# Patient Record
Sex: MALE | Race: WHITE | Employment: OTHER | URBAN - METROPOLITAN AREA
[De-identification: names, ages, dates, MRNs, and addresses within clinical notes are randomized per-mention and may not be internally consistent; named-entity substitution may affect disease eponyms.]

---

## 2018-06-19 ENCOUNTER — COMPLETE SKIN EXAM (OUTPATIENT)
Dept: URBAN - METROPOLITAN AREA CLINIC 14 | Facility: CLINIC | Age: 63
Setting detail: DERMATOLOGY
End: 2018-06-19

## 2018-06-19 DIAGNOSIS — C44.519 BASAL CELL CARCINOMA OF SKIN OF OTHER PART OF TRUNK: ICD-10-CM

## 2018-06-19 PROCEDURE — 17000 DESTRUCT PREMALG LESION: CPT

## 2018-06-19 PROCEDURE — 99203 OFFICE O/P NEW LOW 30 MIN: CPT

## 2019-01-09 PROBLEM — G47.00 INSOMNIA, UNSPECIFIED: Status: ACTIVE | Noted: 2019-01-09

## 2019-01-16 ENCOUNTER — HOSPITAL ENCOUNTER (OUTPATIENT)
Dept: CT IMAGING | Age: 64
Discharge: HOME | End: 2019-01-16
Attending: INTERNAL MEDICINE
Payer: COMMERCIAL

## 2019-01-16 VITALS — BODY MASS INDEX: 20.99 KG/M2 | WEIGHT: 155 LBS | HEIGHT: 72 IN

## 2019-01-16 DIAGNOSIS — Z87.891 FORMER SMOKER: ICD-10-CM

## 2019-01-16 PROCEDURE — G0297 LDCT FOR LUNG CA SCREEN: HCPCS

## 2019-01-16 NOTE — PROGRESS NOTES
The CAT scan shows evidence of emphysema and scarring in the lungs. This is not surprising given the previous history of smoking. There is no evidence of any nodules or any sign of lung cancer. This exam should be repeated on an annual basis.

## 2019-04-10 ENCOUNTER — COMPLETE SKIN EXAM (OUTPATIENT)
Dept: URBAN - METROPOLITAN AREA CLINIC 14 | Facility: CLINIC | Age: 64
Setting detail: DERMATOLOGY
End: 2019-04-10

## 2019-04-10 DIAGNOSIS — D04.71 CARCINOMA IN SITU OF SKIN OF RIGHT LOWER LIMB, INCLUDING HIP: ICD-10-CM

## 2019-04-10 PROCEDURE — 17110 DESTRUCT B9 LESION 1-14: CPT

## 2019-04-10 PROCEDURE — 99213 OFFICE O/P EST LOW 20 MIN: CPT

## 2019-10-14 ENCOUNTER — HOSPITAL ENCOUNTER (OUTPATIENT)
Dept: LAB | Age: 64
Discharge: HOME | End: 2019-10-14

## 2019-10-14 PROCEDURE — 88305 TISSUE EXAM BY PATHOLOGIST: CPT

## 2020-01-09 PROBLEM — K21.9 ESOPHAGEAL REFLUX: Status: ACTIVE | Noted: 2020-01-09

## 2020-01-09 PROBLEM — J43.8 OTHER EMPHYSEMA (HCC): Status: ACTIVE | Noted: 2020-01-09

## 2020-01-27 PROBLEM — R93.1 ELEVATED CORONARY ARTERY CALCIUM SCORE: Status: ACTIVE | Noted: 2020-01-27

## 2020-01-27 PROBLEM — R91.8 ABNORMAL CT SCAN OF LUNG: Status: ACTIVE | Noted: 2020-01-27

## 2020-02-04 PROBLEM — Z87.891 PERSONAL HISTORY OF TOBACCO USE, PRESENTING HAZARDS TO HEALTH: Status: ACTIVE | Noted: 2020-02-04

## 2020-02-04 PROBLEM — R91.8 ENDOBRONCHIAL MASS: Status: ACTIVE | Noted: 2020-02-04

## 2020-02-19 RX ORDER — DIPHENHYDRAMINE HYDROCHLORIDE 50 MG/ML
12.5 INJECTION, SOLUTION INTRAMUSCULAR; INTRAVENOUS
Status: CANCELLED | OUTPATIENT
Start: 2020-02-19

## 2020-02-19 RX ORDER — FENTANYL CITRATE 50 UG/ML
50 INJECTION, SOLUTION INTRAMUSCULAR; INTRAVENOUS
Status: CANCELLED | OUTPATIENT
Start: 2020-02-19

## 2020-02-19 RX ORDER — SODIUM CHLORIDE 9 MG/ML
25 INJECTION, SOLUTION INTRAVENOUS CONTINUOUS
Status: CANCELLED | OUTPATIENT
Start: 2020-02-19

## 2020-02-19 RX ORDER — MIDAZOLAM HYDROCHLORIDE 1 MG/ML
.25-5 INJECTION, SOLUTION INTRAMUSCULAR; INTRAVENOUS
Status: CANCELLED | OUTPATIENT
Start: 2020-02-19

## 2020-02-19 RX ORDER — LIDOCAINE HYDROCHLORIDE 20 MG/ML
JELLY TOPICAL AS NEEDED
Status: CANCELLED | OUTPATIENT
Start: 2020-02-19

## 2020-02-19 RX ORDER — LIDOCAINE HYDROCHLORIDE 40 MG/ML
SOLUTION TOPICAL AS NEEDED
Status: CANCELLED | OUTPATIENT
Start: 2020-02-19

## 2020-02-21 NOTE — ADDENDUM NOTE
[facility]: 2/21/2020 02:08 PM     Modules accepted: Orders

## 2020-03-17 ENCOUNTER — HOSPITAL ENCOUNTER (OUTPATIENT)
Dept: CT IMAGING | Age: 65
Discharge: HOME | End: 2020-03-17
Attending: INTERNAL MEDICINE
Payer: COMMERCIAL

## 2020-03-17 DIAGNOSIS — R91.8 ENDOBRONCHIAL MASS: ICD-10-CM

## 2020-03-17 DIAGNOSIS — Z87.891 PERSONAL HISTORY OF SMOKING: ICD-10-CM

## 2020-03-17 DIAGNOSIS — J44.9 CHRONIC OBSTRUCTIVE PULMONARY DISEASE, UNSPECIFIED COPD TYPE (HCC): ICD-10-CM

## 2020-03-17 PROCEDURE — 71250 CT THORAX DX C-: CPT

## 2020-06-08 ENCOUNTER — FOLLOW-UP (OUTPATIENT)
Dept: URBAN - METROPOLITAN AREA CLINIC 14 | Facility: CLINIC | Age: 65
Setting detail: DERMATOLOGY
End: 2020-06-08

## 2020-06-08 DIAGNOSIS — L90.5 SCAR CONDITIONS AND FIBROSIS OF SKIN: ICD-10-CM

## 2020-06-08 DIAGNOSIS — Z85.828 PERSONAL HISTORY OF OTHER MALIGNANT NEOPLASM OF SKIN: ICD-10-CM

## 2020-06-08 PROCEDURE — OTHER COSMETIC: OTHER

## 2020-06-08 PROCEDURE — 99213 OFFICE O/P EST LOW 20 MIN: CPT

## 2020-08-20 ENCOUNTER — OTHER- (OUTPATIENT)
Dept: URBAN - METROPOLITAN AREA CLINIC 14 | Facility: CLINIC | Age: 65
Setting detail: DERMATOLOGY
End: 2020-08-20

## 2020-08-20 DIAGNOSIS — Z85.828 PERSONAL HISTORY OF OTHER MALIGNANT NEOPLASM OF SKIN: ICD-10-CM

## 2020-08-20 DIAGNOSIS — L24.9 IRRITANT CONTACT DERMATITIS, UNSPECIFIED CAUSE: ICD-10-CM

## 2020-08-20 DIAGNOSIS — D22.5 MELANOCYTIC NEVI OF TRUNK: ICD-10-CM

## 2020-08-20 DIAGNOSIS — L82.1 OTHER SEBORRHEIC KERATOSIS: ICD-10-CM

## 2020-08-20 PROCEDURE — 99213 OFFICE O/P EST LOW 20 MIN: CPT

## 2020-11-17 RX ORDER — FLUOCINONIDE 0.5 MG/G
1 APPLICATION CREAM TOPICAL BID
Qty: 60 | Refills: 1
Start: 2020-11-17

## 2021-03-26 ENCOUNTER — HOSPITAL ENCOUNTER (OUTPATIENT)
Dept: CT IMAGING | Age: 66
Discharge: HOME | End: 2021-03-26
Attending: INTERNAL MEDICINE
Payer: MEDICARE

## 2021-03-26 DIAGNOSIS — R91.8 ENDOBRONCHIAL MASS: ICD-10-CM

## 2021-03-26 PROCEDURE — 71250 CT THORAX DX C-: CPT

## 2021-06-09 ENCOUNTER — HOSPITAL ENCOUNTER (OUTPATIENT)
Dept: LAB | Age: 66
Discharge: HOME | End: 2021-06-09

## 2021-06-09 PROCEDURE — 88305 TISSUE EXAM BY PATHOLOGIST: CPT

## 2021-07-23 PROBLEM — F13.99 SEDATIVE, HYPNOTIC OR ANXIOLYTIC USE, UNSPECIFIED WITH UNSPECIFIED SEDATIVE, HYPNOTIC OR ANXIOLYTIC-INDUCED DISORDER (HCC): Status: ACTIVE | Noted: 2021-07-23

## 2021-08-02 ENCOUNTER — HOSPITAL ENCOUNTER (OUTPATIENT)
Dept: PHYSICAL THERAPY | Age: 66
Discharge: HOME | End: 2021-08-02
Attending: NURSE PRACTITIONER
Payer: MEDICARE

## 2021-08-02 DIAGNOSIS — M54.12 CERVICAL RADICULOPATHY: ICD-10-CM

## 2021-08-02 DIAGNOSIS — M54.2 NECK PAIN: ICD-10-CM

## 2021-08-02 DIAGNOSIS — M50.30 DEGENERATIVE DISC DISEASE, CERVICAL: ICD-10-CM

## 2021-08-02 PROCEDURE — 97161 PT EVAL LOW COMPLEX 20 MIN: CPT

## 2021-08-02 PROCEDURE — 97110 THERAPEUTIC EXERCISES: CPT

## 2021-08-02 PROCEDURE — 97140 MANUAL THERAPY 1/> REGIONS: CPT

## 2021-08-02 NOTE — PROGRESS NOTES
Lon Said  : 1955  Primary: Александр Joshi Medicare Choice *  Secondary:  Therapy Center at [facility]  [address], [address] [place], [place], [address]  [phone]   [phone]      OUTPATIENT PHYSICAL THERAPY: Daily Treatment Note 2021    ICD-10: Treatment Diagnosis: Neck pain [M54.2]                Treatment Diagnosis 2: Cervical radiculopathy [M54.12]                Treatment Diagnosis 3: Degenerative disc disease, cervical [M50.30]  Precautions: None. Allergies: Gluten and Wheat   TREATMENT PLAN:  Effective Dates: 2021 TO 2021. Frequency/Duration: 1-2 times a week for 6 weeks MEDICAL/REFERRING DIAGNOSIS:  Neck pain [M54.2]  Cervical radiculopathy [M54.12]  Degenerative disc disease, cervical [M50.30]   DATE OF ONSET: 2021  REFERRING PHYSICIAN: Jayla Oneill NP MD Orders: Evaluate and Treat   Return MD Appointment: 2021     Pre-treatment Symptoms/Complaints:  Pt reports overall symptoms are mild today due likely to has been taking prescribed prednisone for 5 days. Pain: Initial: Pain Intensity 1: 2  Pain Location 1: Neck  Pain Orientation 1: Right  Post Session:  1/10   Medications Last Reviewed:  2021  Updated Objective Findings:  See evaluation note from today   TREATMENT:   THERAPEUTIC EXERCISE: (15 minutes):  Exercises per grid below to improve mobility, strength and coordination. Required moderate visual, verbal, manual and tactile cues to promote proper body alignment, promote proper body posture and promote proper body mechanics. Progressed resistance, range, repetitions and complexity of movement as indicated.      Date:  2021 Date:   Date:     Activity/Exercise Parameters Parameters Parameters   Cervical rotation -->     Chin tuck Supine, 10x 5sec     Scapular retraction Seated 20x     Upper trap stretch Seated 3x30 sec                         Time spent with patient reviewing proper muscle recruitment and technique with exercises. MANUAL THERAPY: (23 minutes): Joint mobilization, Soft tissue mobilization and PROM, manual stretching, manual traction was utilized and necessary because of the patient's restricted joint motion, painful spasm, loss of articular motion and restricted motion of soft tissue   Manual traction: cervical to patient tolerance.  Manual stretching: Bilateral upper trapezius.  PROM: Rotation bilateral to patient tolerance.  Joint Mobs: Lower cervical grade II/III upglides bilaterally.  Soft tissue mobilization: cervical paraspinals, suboccipitals, Bilateral upper trap. MODALITIES: (0 minutes):      None today. HEP: As above; handouts given to patient for all exercises. Treatment/Session Summary:    · Response to Treatment:  Good tolerance to manual and therex interventions; increased verbal instruction provided to ensure correct performance of chin tuck exercise; improved c spine rotation observed following lower c spine mobilization. .  · Communication/Consultation:  HEP handout provided. · Equipment provided today:  None today  · Recommendations/Intent for next treatment session: Next visit will focus on c spine and postural stability strengthening, c spine mobility improvement, c spine muscular flexibility improvement. Total Treatment Billable Duration:  58 minutes: 20 evaluation, 15 therex, 23 manual therapy.   PT Patient Time In/Time Out  Time In: 1230  Time Out: [address], ROSALIND

## 2021-08-02 NOTE — THERAPY EVALUATION
Hamilton Villagran  : 1955  Primary: Tierney Joshi Medicare Choice *  Secondary:  Therapy Center at [address], [place], [address] [place] [address]  [phone]   [phone]       OUTPATIENT PHYSICAL THERAPY:Initial Assessment 2021    ICD-10: Treatment Diagnosis: Neck pain [M54.2]                Treatment Diagnosis 2: Cervical radiculopathy [M54.12]                Treatment Diagnosis 3: Degenerative disc disease, cervical [M50.30]  Precautions: None. Allergies: Gluten and Wheat   TREATMENT PLAN:  Effective Dates: 2021 TO 2021. Frequency/Duration: 1-2 times a week for 6 weeks MEDICAL/REFERRING DIAGNOSIS:  Neck pain [M54.2]  Cervical radiculopathy [M54.12]  Degenerative disc disease, cervical [M50.30]   DATE OF ONSET: 2021  REFERRING PHYSICIAN: Everton Dickson NP MD Orders: Evaluate and Treat   Return MD Appointment: 2021     INITIAL ASSESSMENT:  Mr. Domenico Bautista is a 72 y.o. male presenting to physical therapy with complaints of neck pain with radicular symptoms left upper extremity; insidious onset starting per patient Spring 2021. Pt reports underwent x-ray imaging at MD office indicating degenerative disc disease cervical spine. Pt was prescribed prednisone regiment and referred for outpatient PT treatment. Pt denies vision changes/dizziness/loss of consciousness/head ache symptoms. Pt reports some burning in bilateral feet per patient undergoing assessment for potential low back involvement vs. Peripheral neuropathy symptoms. Pt will benefit from skilled PT treatment to address deficits in cervical spine mobility/AROM, postural stability strength and functional activity tolerance. PROBLEM LIST (Impacting functional limitations):  1. Decreased Strength  2. Decreased ADL/Functional Activities  3. Increased Pain  4. Decreased Activity Tolerance  5. Decreased Flexibility/Joint Mobility  6.  Decreased [place] with Home Exercise Program INTERVENTIONS PLANNED: (Treatment may consist of any combination of the following)  1. Balance Exercise  2. Bed Mobility  3. Cryotherapy  4. Electrical Stimulation  5. Family Education  6. Gait Training  7. Heat  8. Home Exercise Program (HEP)  9. Manual Therapy  10. Neuromuscular Re-education/Strengthening  11. Range of Motion (ROM)  12. Therapeutic Activites  13. Therapeutic Exercise/Strengthening  14. Transcutaneous Electrical Nerve Stimulation (TENS)  15. Transfer Training  16. Ultrasound (US)     GOALS: (Goals have been discussed and agreed upon with patient.)  Short-Term Functional Goals: Time Frame: 8/2/2021 to 8/23/2021  1. Patient demonstrates independence with home exercise program without verbal cueing provided by therapist.  2. Pt will reports worst pain 3/10 or less in order to return to unrestricted prolonged sitting 30 mins or greater. Discharge Goals: Time Frame: 8/2/2021 to 9/13/2021  1. Pt will demonstrate cervical spine AROM side bend at 45 degrees bilaterally and rotation at 60 degrees bilaterally in order to improve head turn activities including checking blind spots while driving. 2. Pt will demonstrate gross bilateral upper extremity strength 4+ to 5/5 in order to return to unrestricted yard work activities including mowing lawn. 3. Pt will report no radicular symptoms into bilateral upper extremities in order to improve reaching and lifting activities. 4. NDI score of 0/50 in order to demonstrate overall functional improvement. Outcome Measure: Tool Used: Neck Disability Index (NDI)  Score:  Initial: 2/50  Most Recent: X/50 (Date: -- )   Interpretation of Score: The Neck Disability Index is a revised form of the Oswestry Low Back Pain Index and is designed to measure the activities of daily living in person's with neck pain. Each section is scored on a 0-5 scale, 5 representing the greatest disability. The scores of each section are added together for a total score of 50. Medical Necessity:   · Patient is expected to demonstrate progress in strength, range of motion, coordination and functional technique to increase independence with head turns while driving, activities required upward gaze, and functional reaching/lifting activities. .  · Skilled intervention continues to be required due to decreased AROM, decreased strength and decreased functional activity tolerance. .  Reason for Services/Other Comments:  · Patient continues to require skilled intervention due to increasing complexity of exercise. .  Total Evaluation Duration: 20 minutes    Rehabilitation Potential For Stated Goals: Good  Regarding Warneord Dubin Pressley's therapy, I certify that the treatment plan above will be carried out by a therapist or under their direction. Thank you for this referral,  Ariane Rowley, PT   DPT  Referring Physician Signature: Lydia Gupta NP _______________________________ Date _____________             PAIN/SUBJECTIVE:    Initial: Pain Intensity 1: 2  Pain Location 1: Neck  Pain Orientation 1: Right  Post Session:  1/10    HISTORY:    History of Injury/Illness (Reason for Referral):  Mr. Kelly Adler is a 72 y.o. male presenting to physical therapy with complaints of neck pain with radicular symptoms left upper extremity; insidious onset starting per patient Spring 2021. Pt reports underwent x-ray imaging at MD office indicating degenerative disc disease cervical spine. Pt was prescribed prednisone regiment and referred for outpatient PT treatment. Pt denies vision changes/dizziness/loss of consciousness/head ache symptoms. Pt reports some burning in bilateral feet per patient undergoing assessment for potential low back involvement vs. Peripheral neuropathy symptoms.   Past Medical History/Comorbidities:   Mr. Kelly Adler  has a past medical history of Abdominal pain, epigastric, Acute upper respiratory infections of unspecified site, Celiac disease, Depression, Disturbance of skin sensation, Dyspepsia and other specified disorders of function of stomach, Generalized anxiety disorder, Hereditary spherocytosis ([address] Insomnia, unspecified, and Unspecified hypothyroidism. Mr. Anirudh Green  has a past surgical history that includes hx cholecystectomy (12/24/14); hx tonsillectomy; and hx hernia repair. Social History/Living Environment:    Lives with spouse. Prior Level of Function/Work/Activity:  Prior level of function includes independent head turn activities, independent house work, yard work and recreational wood working activities. Dominant Side:         RIGHT    Ambulatory/Rehab Services H2 Model Falls Risk Assessment    Risk Factors:       (1)  Gender [Male] Ability to Rise from Chair:       (0)  Ability to rise in a single movement    Falls Prevention Plan:       No modifications necessary    Total: (5 or greater = High Risk): 1    ©2010 [facility]. All Rights Reserved. [facility] Patent #[id number]. Federal Law prohibits the replication, distribution or use without written permission from [facility] [employer]    Current Medications:        Current Outpatient Medications:     mecobalamin, vitamin B12, (B12 Active) 1,000 mcg chew, , Disp: , Rfl:     multivit with minerals/lutein (MULTIVITAMIN 50 PLUS PO), , Disp: , Rfl:     methylPREDNISolone (MEDROL DOSEPACK) 4 mg tablet, FOLLOW PACKAGE INSTRUCTIONS, Disp: 1 Dose Pack, Rfl: 0    LORazepam (ATIVAN) 0.5 mg tablet, Take 1 Tablet by mouth two (2) times daily as needed for Anxiety. Max Daily Amount: 1 mg., Disp: 60 Tablet, Rfl: 5    FLUoxetine (PROzac) 20 mg capsule, Take 20 mg by mouth daily. , Disp: , Rfl:     cyanocobalamin 1,000 mcg tablet, Take 1,000 mcg by mouth daily. , Disp: , Rfl:     TURMERIC PO, Take  by mouth daily. , Disp: , Rfl:     rosuvastatin (CRESTOR) 20 mg tablet, TAKE ONE TABLET BY MOUTH EVERY EVENING, Disp: 90 Tab, Rfl: 3    mirtazapine (REMERON) 15 mg tablet, Take 1 Tab by mouth daily. , Disp: 90 Tab, Rfl: 3    levothyroxine (SYNTHROID) 125 mcg tablet, Take 1 Tab by mouth Daily (before breakfast). , Disp: 90 Tab, Rfl: 5    Date Last Reviewed:  8/2/2021    Number of Personal Factors/Comorbidities that affect the Plan of Care: 1-2: MODERATE COMPLEXITY    EXAMINATION:      Patient denies any increase of symptoms with coughing, sneezing or valsalva maneuver. Patient denies any headaches, changes in vision, dizziness, vertigo, nausea, drop attacks, black outs, tinnitus, dysphagia, dysarthria, LE symptoms or bowel/bladder dysfunction. Observation/Orthostatic Postural Assessment: Posture: fair, mild to moderate rounded shoulder and forward head. Palpation: Mild tenderness bilateral upper trapezius, scalenes right greater than left. Vertebral-Basilar Screen: negative. ROM:   Cervical extension (degrees): WNL°   Cervical flexion: WNL°   Cervical left side bend: 45°   Cervical right side bend: 40°   Cervical left rotation: 60°   Cervical right rotation: 55°     Strength: Gross BUE strength: 4 to 4+/5    Joint Mobility: Hypomobile lower c spine and upper thoracic spine. Special Tests: Ligament stress tests performed through upper cervical spine for transverse ligament including Sharp-Ann test is WNL, and Bethel-Axis shear test is WNL. Bethel-Axis side flexion test for integrity of alar ligament is WNL. Spurling test is negative. Cervical distraction is \"feels good\". Neurovascular testing for thoracic outlet syndrome is WNL. Neurological Screen:  Myotomes: Key muscle strength testing for bilateral UE is WNL. Dermatomes: Sensation testing through bilateral upper quadrants for light touch is WNL. Reflexes: Biceps (C5), brachioradialis (C6), and triceps (C7) are WNL and WNL. Neural tension tests: Upper limb tension test is WNL. Slump test is WNL. Body Structures Involved:  1. Nerves  2. Bones  3. Joints  4. Muscles  5. Ligaments Body Functions Affected:  1. Neuromusculoskeletal  2.  Movement Related Activities and Participation Affected:  1. Mobility  2. Interpersonal Interactions and Relationships  3.  Community, Social and [place]    Number of elements (examined above) that affect the Plan of Care: 4+: HIGH COMPLEXITY    CLINICAL PRESENTATION:    Presentation: Evolving clinical presentation with changing clinical characteristics: MODERATE COMPLEXITY    CLINICAL DECISION MAKING:    Use of outcome tool(s) and clinical judgement create a POC that gives a: Clear prediction of patient's progress: LOW COMPLEXITY

## 2021-08-06 ENCOUNTER — HOSPITAL ENCOUNTER (OUTPATIENT)
Dept: PHYSICAL THERAPY | Age: 66
Discharge: HOME | End: 2021-08-06
Attending: NURSE PRACTITIONER
Payer: MEDICARE

## 2021-08-06 PROCEDURE — 97110 THERAPEUTIC EXERCISES: CPT

## 2021-08-06 PROCEDURE — 97140 MANUAL THERAPY 1/> REGIONS: CPT

## 2021-08-06 NOTE — PROGRESS NOTES
Anahi Adrianna  : 1955  Primary: [facility] Medicare Choice *  Secondary:  Therapy Center at [facility]  [address], [address] [place], [address]  [phone]   [phone]      OUTPATIENT PHYSICAL THERAPY: Daily Treatment Note 2021    ICD-10: Treatment Diagnosis: Neck pain [M54.2]                Treatment Diagnosis 2: Cervical radiculopathy [M54.12]                Treatment Diagnosis 3: Degenerative disc disease, cervical [M50.30]  Precautions: None. Allergies: Gluten and Wheat   TREATMENT PLAN:  Effective Dates: 2021 TO 2021. Frequency/Duration: 1-2 times a week for 6 weeks MEDICAL/REFERRING DIAGNOSIS:  Neck pain [M54.2]  Cervical radiculopathy [M54.12]  Degenerative disc disease, cervical [M50.30]   DATE OF ONSET: 2021  REFERRING PHYSICIAN: King Lalit NP MD Orders: Evaluate and Treat   Return MD Appointment: 2021     Pre-treatment Symptoms/Complaints:  Pt reports was chain sawing and using loppers yesterday and has increased neck tightness today. HEP compliance good. Pain: Initial: Pain Intensity 1: 2  Pain Location 1: Neck  Pain Orientation 1: Right  Post Session:  0/10   Medications Last Reviewed:  2021  Updated Objective Findings:  Posture: mild forward head and slumped seated posture; correctable with verbal cues. TREATMENT:   THERAPEUTIC EXERCISE: (23 minutes):  Exercises per grid below to improve mobility, strength and coordination. Required moderate visual, verbal, manual and tactile cues to promote proper body alignment, promote proper body posture and promote proper body mechanics. Progressed resistance, range, repetitions and complexity of movement as indicated.      Date:  2021 Date:   Date:     Activity/Exercise Parameters Parameters Parameters   Cervical rotation With open book x10     Chin tuck Supine, 10x 5sec     Scapular retraction Seated 20x     Upper trap stretch Seated 3x30 sec     Open book x10 each     Doorway stretch 3x 30 sec     TB Row      TB Extension        Time spent with patient reviewing proper muscle recruitment and technique with exercises. MANUAL THERAPY: (30 minutes): Joint mobilization, Soft tissue mobilization and PROM, manual stretching, manual traction was utilized and necessary because of the patient's restricted joint motion, painful spasm, loss of articular motion and restricted motion of soft tissue   Manual traction: cervical to patient tolerance.  Manual stretching: Bilateral upper trapezius.  PROM: Rotation bilateral to patient tolerance.  Joint Mobs: Lower cervical grade II/III upglides bilaterally.  Soft tissue mobilization: cervical paraspinals, suboccipitals, Bilateral upper trap. MODALITIES: (0 minutes):      None today. HEP: As above; handouts given to patient for all exercises. Treatment/Session Summary:    · Response to Treatment:  Good symptoms relief noted following today's treatment. 1st rib mobilizations added to address observed 1st rib elevation today. .  · Communication/Consultation:  HEP handout provided. · Equipment provided today:  None today  · Recommendations/Intent for next treatment session: Next visit will focus on c spine and postural stability strengthening, c spine mobility improvement, c spine muscular flexibility improvement. Total Treatment Billable Duration:  53 mins.   PT Patient Time In/Time Out  Time In: 2874  Time Out: 1001 [facility],

## 2021-08-11 ENCOUNTER — HOSPITAL ENCOUNTER (OUTPATIENT)
Dept: PHYSICAL THERAPY | Age: 66
Discharge: HOME | End: 2021-08-11
Attending: NURSE PRACTITIONER
Payer: MEDICARE

## 2021-08-11 PROCEDURE — 97140 MANUAL THERAPY 1/> REGIONS: CPT

## 2021-08-11 PROCEDURE — 97110 THERAPEUTIC EXERCISES: CPT

## 2021-08-11 NOTE — PROGRESS NOTES
Emerita Mccartney  : 1955  Primary: Alondra Joshi Medicare Choice *  Secondary:  Therapy Center at [facility]  [address] [place], [address], [place], [address]  [phone]   [phone]      OUTPATIENT PHYSICAL THERAPY: Daily Treatment Note 2021    ICD-10: Treatment Diagnosis: Neck pain [M54.2]                Treatment Diagnosis 2: Cervical radiculopathy [M54.12]                Treatment Diagnosis 3: Degenerative disc disease, cervical [M50.30]  Precautions: None. Allergies: Gluten and Wheat   TREATMENT PLAN:  Effective Dates: 2021 TO 2021. Frequency/Duration: 1-2 times a week for 6 weeks MEDICAL/REFERRING DIAGNOSIS:  Neck pain [M54.2]  Cervical radiculopathy [M54.12]  Degenerative disc disease, cervical [M50.30]   DATE OF ONSET: 2021  REFERRING PHYSICIAN: Bryan Vizcaino NP MD Orders: Evaluate and Treat   Return MD Appointment: 2021     Pre-treatment Symptoms/Complaints:  Pt reports overall soreness minimal today, per pt slight burning in posterior bilateral upper arms at start of treatment, HEP compliance excellent. Pain: Initial: Pain Intensity 1: 1  Pain Location 1: Neck  Pain Orientation 1: Right, Left  Post Session:  0/10   Medications Last Reviewed:  2021  Updated Objective Findings:  Posture: pt holds correct posture throughout treatment with only 1x verbal cues. TREATMENT:   THERAPEUTIC EXERCISE: (23 minutes):  Exercises per grid below to improve mobility, strength and coordination. Required moderate visual, verbal, manual and tactile cues to promote proper body alignment, promote proper body posture and promote proper body mechanics. Progressed resistance, range, repetitions and complexity of movement as indicated.      Date:  2021 Date:   Date:     Activity/Exercise Parameters Parameters Parameters   Cervical rotation With open book x10 With open book, x20    Chin tuck Supine, 10x 5sec Supine, 20x 5sec     Scapular retraction Seated 20x 20x    Upper trap stretch Seated 3x30 sec Seated, 3x30 sec    Open book x10 each x20 each    Doorway stretch 3x 30 sec 3x 30sec    TB Row  Green, 1x10    TB Extension  Green, 1x10    Cervical extension with towel  -->      Time spent with patient reviewing proper muscle recruitment and technique with exercises. MANUAL THERAPY: (30 minutes): Joint mobilization, Soft tissue mobilization and PROM, manual stretching, manual traction was utilized and necessary because of the patient's restricted joint motion, painful spasm, loss of articular motion and restricted motion of soft tissue   Manual traction: cervical to patient tolerance.  Manual stretching: Bilateral upper trapezius.  PROM: Rotation bilateral to patient tolerance.  Joint Mobs: Lower cervical grade II/III upglides bilaterally.  Soft tissue mobilization: cervical paraspinals, suboccipitals, Bilateral upper trap. MODALITIES: (0 minutes):      None today. HEP: As above; handouts given to patient for all exercises. Treatment/Session Summary:    · Response to Treatment:  Good tolerance to therex and manual interventions. .  · Communication/Consultation:  HEP handout provided. · Equipment provided today:  None today  · Recommendations/Intent for next treatment session: Next visit will focus on c spine and postural stability strengthening, c spine mobility improvement, c spine muscular flexibility improvement. Total Treatment Billable Duration:  53 mins.   PT Patient Time In/Time Out  Time In: 8949  Time Out: 1001 [facility],

## 2021-08-13 ENCOUNTER — HOSPITAL ENCOUNTER (OUTPATIENT)
Dept: PHYSICAL THERAPY | Age: 66
Discharge: HOME | End: 2021-08-13
Attending: NURSE PRACTITIONER
Payer: MEDICARE

## 2021-08-13 PROCEDURE — 97110 THERAPEUTIC EXERCISES: CPT

## 2021-08-13 PROCEDURE — 97140 MANUAL THERAPY 1/> REGIONS: CPT

## 2021-08-13 NOTE — PROGRESS NOTES
Herson Klein  : 1955  Primary: Donne Speaker [employer] Choice *  Secondary:  [facility]  [address], [address], [place], [address]  [phone]   [phone]      OUTPATIENT PHYSICAL THERAPY: Daily Treatment Note 2021    ICD-10: Treatment Diagnosis: Neck pain [M54.2]                Treatment Diagnosis 2: Cervical radiculopathy [M54.12]                Treatment Diagnosis 3: Degenerative disc disease, cervical [M50.30]  Precautions: None. Allergies: Gluten and Wheat   TREATMENT PLAN:  Effective Dates: 2021 TO 2021. Frequency/Duration: 1-2 times a week for 6 weeks MEDICAL/REFERRING DIAGNOSIS:  Neck pain [M54.2]  Cervical radiculopathy [M54.12]  Degenerative disc disease, cervical [M50.30]   DATE OF ONSET: 2021  REFERRING PHYSICIAN: Melinda Saez NP MD Orders: Evaluate and Treat   Return MD Appointment: 2021     Pre-treatment Symptoms/Complaints:  Pt reports pain levels are minimal today; exercises at home going well. Pain: Initial: Pain Intensity 1: 0  Pain Location 1: Neck  Post Session:  0/10   Medications Last Reviewed:  2021  Updated Objective Findings:  None Today   TREATMENT:   THERAPEUTIC EXERCISE: (23 minutes):  Exercises per grid below to improve mobility, strength and coordination. Required moderate visual, verbal, manual and tactile cues to promote proper body alignment, promote proper body posture and promote proper body mechanics. Progressed resistance, range, repetitions and complexity of movement as indicated.      Date:  2021 Date:  2021 Date:  2021   Activity/Exercise Parameters Parameters Parameters   Cervical rotation With open book x10 With open book, x20 With open book, x20   Chin tuck Supine, 10x 5sec Supine, 20x 5sec  Supine, 20x 5sec    Scapular retraction Seated 20x 20x 15x   Upper trap stretch Seated 3x30 sec Seated, 3x30 sec Seated, [id number]   Open book x10 each x20 each x20 each   Doorway stretch 3x 30 sec 3x 30sec [id number]   TB Row  Green, 1x10 Green, 1x10   TB Extension  Green, 1x10 Green, 1x10   Thoracic extension with stability ball  --> x10     Time spent with patient reviewing proper muscle recruitment and technique with exercises. MANUAL THERAPY: (30 minutes): Joint mobilization, Soft tissue mobilization and PROM, manual stretching, manual traction was utilized and necessary because of the patient's restricted joint motion, painful spasm, loss of articular motion and restricted motion of soft tissue   Manual traction: cervical to patient tolerance.  Manual stretching: Bilateral upper trapezius.  PROM: Rotation bilateral to patient tolerance.  Joint Mobs: Lower cervical grade II/III upglides bilaterally.  Soft tissue mobilization: cervical paraspinals, suboccipitals, Bilateral upper trap. MODALITIES: (0 minutes):      None today. HEP: As above; handouts given to patient for all exercises. Treatment/Session Summary:    · Response to Treatment:  Verbal cues for posture during standing postural strengthening exercises. .  · Communication/Consultation:  None today  · Equipment provided today:  None today  · Recommendations/Intent for next treatment session: Next visit will focus on c spine and postural stability strengthening, c spine mobility improvement, c spine muscular flexibility improvement. Total Treatment Billable Duration:  53 mins.   PT Patient Time In/Time Out  Time In: 0903  Time Out: 0957  Ariane Rowley PT

## 2021-08-17 ENCOUNTER — HOSPITAL ENCOUNTER (OUTPATIENT)
Dept: PHYSICAL THERAPY | Age: 66
Discharge: HOME | End: 2021-08-17
Attending: NURSE PRACTITIONER
Payer: MEDICARE

## 2021-08-17 PROCEDURE — 97140 MANUAL THERAPY 1/> REGIONS: CPT

## 2021-08-17 PROCEDURE — 97110 THERAPEUTIC EXERCISES: CPT

## 2021-08-17 NOTE — PROGRESS NOTES
Herson Klein  : 1955  Primary: Donne Speaker [employer] Choice *  Secondary:  [facility]  [address], [address] [facility], [address] [facility] [address]  [phone]   [phone]      OUTPATIENT PHYSICAL THERAPY: Daily Treatment Note 2021    ICD-10: Treatment Diagnosis: Neck pain [M54.2]                Treatment Diagnosis 2: Cervical radiculopathy [M54.12]                Treatment Diagnosis 3: Degenerative disc disease, cervical [M50.30]  Precautions: None. Allergies: Gluten and Wheat   TREATMENT PLAN:  Effective Dates: 2021 TO 2021. Frequency/Duration: 1-2 times a week for 6 weeks MEDICAL/REFERRING DIAGNOSIS:  Neck pain [M54.2]  Cervical radiculopathy [M54.12]  Degenerative disc disease, cervical [M50.30]   DATE OF ONSET: 2021  REFERRING PHYSICIAN: Melinda Saez NP  MD Orders: Evaluate and Treat   Return MD Appointment: 2021     Pre-treatment Symptoms/Complaints:  Pt reports some mid back soreness today unsure of cause; patient reports has been steadily keeping up with yard work includes trimming bushes; HEP compliance excellent. Pain: Initial: Pain Intensity 1: 1  Pain Location 1: Neck  Pain Orientation 1: Right, Left  Post Session:  0/10   Medications Last Reviewed:  2021  Updated Objective Findings:  Increased left sided upper trap tightness noted today. TREATMENT:   THERAPEUTIC EXERCISE: (15 minutes):  Exercises per grid below to improve mobility, strength and coordination. Required moderate visual, verbal, manual and tactile cues to promote proper body alignment, promote proper body posture and promote proper body mechanics. Progressed resistance, range, repetitions and complexity of movement as indicated.      Date:  2021 Date:  2021 Date:  2021   Activity/Exercise Parameters Parameters Parameters   Cervical rotation With open book x10 With open book, x20 With open book, x20   Chin tuck Supine, 10x 5sec Supine, 20x 5sec  Supine, 20x 5sec    Scapular retraction Seated 10x 20x 15x   Upper trap stretch Seated 3x30 sec Seated, 3x30 sec Seated, [id number]   Open book x10 each x20 each x20 each   Doorway stretch [id number] 3x 30sec [id number]   TB Row Green, 1x10 Green, 1x10 Green, 1x10   TB Extension Green, 1x10 Green, 1x10 Green, 1x10   Thoracic extension with stability ball --> --> x10     Time spent with patient reviewing proper muscle recruitment and technique with exercises. MANUAL THERAPY: (30 minutes): Joint mobilization, Soft tissue mobilization and PROM, manual stretching, manual traction was utilized and necessary because of the patient's restricted joint motion, painful spasm, loss of articular motion and restricted motion of soft tissue   Manual traction: cervical to patient tolerance.  Manual stretching: Bilateral upper trapezius.  PROM: Rotation bilateral to patient tolerance.  Joint Mobs: Lower cervical grade II/III upglides bilaterally.  Soft tissue mobilization: cervical paraspinals, suboccipitals, Bilateral upper trap. MODALITIES: (0 minutes):      None today. HEP: As above; handouts given to patient for all exercises. Treatment/Session Summary:    · Response to Treatment:  Exercise program decreased slightly today to prevent excessive post treatment soreness as patient verbalized has done exercises twice today already. · Communication/Consultation:  None today  · Equipment provided today:  None today  · Recommendations/Intent for next treatment session: Next visit will focus on c spine and postural stability strengthening, c spine mobility improvement, c spine muscular flexibility improvement. Total Treatment Billable Duration:  45 mins.   PT Patient Time In/Time Out  Time In: 1430  Time Out: 1518  Layton Mariano, PT

## 2021-08-19 ENCOUNTER — APPOINTMENT (OUTPATIENT)
Dept: PHYSICAL THERAPY | Age: 66
End: 2021-08-19
Attending: NURSE PRACTITIONER
Payer: MEDICARE

## 2021-08-20 ENCOUNTER — HOSPITAL ENCOUNTER (OUTPATIENT)
Dept: PHYSICAL THERAPY | Age: 66
Discharge: HOME | End: 2021-08-20
Attending: NURSE PRACTITIONER
Payer: MEDICARE

## 2021-08-20 PROCEDURE — 97140 MANUAL THERAPY 1/> REGIONS: CPT

## 2021-08-20 PROCEDURE — 97110 THERAPEUTIC EXERCISES: CPT

## 2021-08-20 NOTE — PROGRESS NOTES
Lon Said  : 1955  Primary: Александр Joshi Medicare Choice *  Secondary:  [facility]  [address] [facility], [address] [place], [place], [address]  [phone]   [phone]      OUTPATIENT PHYSICAL THERAPY: Daily Treatment Note 2021    ICD-10: Treatment Diagnosis: Neck pain [M54.2]                Treatment Diagnosis 2: Cervical radiculopathy [M54.12]                Treatment Diagnosis 3: Degenerative disc disease, cervical [M50.30]  Precautions: None. Allergies: Gluten and Wheat   TREATMENT PLAN:  Effective Dates: 2021 TO 2021. Frequency/Duration: 1-2 times a week for 6 weeks MEDICAL/REFERRING DIAGNOSIS:  Neck pain [M54.2]  Cervical radiculopathy [M54.12]  Degenerative disc disease, cervical [M50.30]   DATE OF ONSET: 2021  REFERRING PHYSICIAN: Jayla Oneill NP MD Orders: Evaluate and Treat   Return MD Appointment: 2021     Pre-treatment Symptoms/Complaints:  Pt reports no radicular symptoms and only slight neck pain at start of treatment today. Pain: Initial: Pain Intensity 1: 1  Pain Location 1: Neck  Pain Orientation 1: Right, Left  Post Session:  0/10   Medications Last Reviewed:  2021  Updated Objective Findings:  Minimal upper trap tightness noted bilaterally today compared to previous visit. TREATMENT:   THERAPEUTIC EXERCISE: (23 minutes):  Exercises per grid below to improve mobility, strength and coordination. Required moderate visual, verbal, manual and tactile cues to promote proper body alignment, promote proper body posture and promote proper body mechanics. Progressed resistance, range, repetitions and complexity of movement as indicated.      Date:  2021 Date:  2021 Date:  2021   Activity/Exercise Parameters Parameters Parameters   Cervical rotation With open book x10 With open book, x20 With open book, x20   Chin tuck Supine, 10x 5sec Supine, 20x 5sec     Chin tuck head lift, x10  Supine, 20x 5sec    Scapular retraction Seated 10x 20x 15x   Upper trap stretch Seated 3x30 sec Seated, 3x30 sec Seated, [id number]   Open book x10 each See above. x20 each   Doorway stretch [id number] 3x 30sec [id number]   TB Row Green, 1x10 Blue, 2x10 Green, 1x10   TB Extension Green, 1x10 Green, 2x10 Green, 1x10   Thoracic extension with stability ball --> --> x10      Time spent with patient reviewing proper muscle recruitment and technique with exercises. MANUAL THERAPY: (30 minutes): Joint mobilization, Soft tissue mobilization and PROM, manual stretching, manual traction was utilized and necessary because of the patient's restricted joint motion, painful spasm, loss of articular motion and restricted motion of soft tissue   Manual traction: cervical to patient tolerance.  Manual stretching: Bilateral upper trapezius.  PROM: Rotation bilateral to patient tolerance.  Joint Mobs: Lower cervical grade II/III upglides bilaterally.  Soft tissue mobilization: cervical paraspinals, suboccipitals, Bilateral upper trap. MODALITIES: (0 minutes):      None today. HEP: As above; handouts given to patient for all exercises. Treatment/Session Summary:    · Response to Treatment:  Patient tolerates full exercise progran today with deep neck flexor progression added chin tuck head lift exercise. · Communication/Consultation:  None today  · Equipment provided today:  None today  · Recommendations/Intent for next treatment session: Next visit will focus on c spine and postural stability strengthening, c spine mobility improvement, c spine muscular flexibility improvement. Total Treatment Billable Duration:  53 mins.   PT Patient Time In/Time Out  Time In: 1002  Time Out: Lindy [id number], PT

## 2021-08-23 ENCOUNTER — HOSPITAL ENCOUNTER (OUTPATIENT)
Dept: PHYSICAL THERAPY | Age: 66
Discharge: HOME | End: 2021-08-23
Attending: NURSE PRACTITIONER
Payer: MEDICARE

## 2021-08-23 PROCEDURE — 97140 MANUAL THERAPY 1/> REGIONS: CPT

## 2021-08-23 PROCEDURE — 97110 THERAPEUTIC EXERCISES: CPT

## 2021-08-23 NOTE — PROGRESS NOTES
Melvaia Ren  : 1955  Primary: Disha Joshi Medicare Choice *  Secondary:  [facility]  [address], [address] [place], [place], [address]  [phone]   [phone]      OUTPATIENT PHYSICAL THERAPY: Daily Treatment Note 2021    ICD-10: Treatment Diagnosis: Neck pain [M54.2]                Treatment Diagnosis 2: Cervical radiculopathy [M54.12]                Treatment Diagnosis 3: Degenerative disc disease, cervical [M50.30]  Precautions: None. Allergies: Gluten and Wheat   TREATMENT PLAN:  Effective Dates: 2021 TO 2021. Frequency/Duration: 1-2 times a week for 6 weeks MEDICAL/REFERRING DIAGNOSIS:  Neck pain [M54.2]  Cervical radiculopathy [M54.12]  Degenerative disc disease, cervical [M50.30]   DATE OF ONSET: 2021  REFERRING PHYSICIAN: Jeff Mosher NP MD Orders: Evaluate and Treat   Return MD Appointment: 2021     Pre-treatment Symptoms/Complaints:  Pt reports minimal symptoms over weekend. Pt reports upper arm symptoms are less frequent overall. Pain: Initial: Pain Intensity 1: 1  Pain Location 1: Neck  Pain Orientation 1: Right, Left  Post Session:  0/10   Medications Last Reviewed:  2021  Updated Objective Findings:  None Today   TREATMENT:   THERAPEUTIC EXERCISE: (23 minutes):  Exercises per grid below to improve mobility, strength and coordination. Required moderate visual, verbal, manual and tactile cues to promote proper body alignment, promote proper body posture and promote proper body mechanics. Progressed resistance, range, repetitions and complexity of movement as indicated.      Date:  2021 Date:  2021 Date:  2021   Activity/Exercise Parameters Parameters Parameters   Cervical rotation With open book x10 With open book, x20 With open book, x20   Chin tuck Supine, 10x 5sec Supine, 20x 5sec     Chin tuck head lift, x10  Supine, 20x 5sec     Chin tuck head lift, x10    Scapular retraction Seated 10x 20x 20x   Upper trap stretch Seated 3x30 sec Seated, 3x30 sec Seated, [id number]   Open book x10 each See above. x20 each   Doorway stretch [id number] 3x 30sec [id number]   TB Row Green, 1x10 Blue, 2x10 Blue, 1x10   TB Extension Green, 1x10 Green, 2x10 Green, 1x10   Thoracic extension with stability ball --> --> x10   TRX flexion/extension         Time spent with patient reviewing proper muscle recruitment and technique with exercises. MANUAL THERAPY: (30 minutes): Joint mobilization, Soft tissue mobilization and PROM, manual stretching, manual traction was utilized and necessary because of the patient's restricted joint motion, painful spasm, loss of articular motion and restricted motion of soft tissue   Manual traction: cervical to patient tolerance.  Manual stretching: Bilateral upper trapezius.  PROM: Rotation bilateral to patient tolerance.  Joint Mobs: Lower cervical grade II/III upglides bilaterally.  Soft tissue mobilization: cervical paraspinals, suboccipitals, Bilateral upper trap. MODALITIES: (0 minutes):      None today. HEP: As above; handouts given to patient for all exercises. Treatment/Session Summary:    · Response to Treatment:  Good tolerance to therex and manual interventions; limited range performed on TRX exercise to prevent excessive left shoulder symptoms. · Communication/Consultation:  None today  · Equipment provided today:  None today  · Recommendations/Intent for next treatment session: Next visit will focus on c spine and postural stability strengthening, c spine mobility improvement, c spine muscular flexibility improvement. Total Treatment Billable Duration:  53 mins.   PT Patient Time In/Time Out  Time In: 1330  Time Out: [address], PT

## 2021-08-26 ENCOUNTER — HOSPITAL ENCOUNTER (OUTPATIENT)
Dept: PHYSICAL THERAPY | Age: 66
Discharge: HOME | End: 2021-08-26
Attending: NURSE PRACTITIONER
Payer: MEDICARE

## 2021-08-26 PROCEDURE — 97140 MANUAL THERAPY 1/> REGIONS: CPT

## 2021-08-26 PROCEDURE — 97110 THERAPEUTIC EXERCISES: CPT

## 2021-08-26 NOTE — PROGRESS NOTES
Justin Lovelace  : 1955  Primary: Facundo Joshi Medicare Choice *  Secondary:  [facility]  [address], [address] [place], [address]  [phone]   [phone]      OUTPATIENT PHYSICAL THERAPY: Daily Treatment Note 2021    ICD-10: Treatment Diagnosis: Neck pain [M54.2]                Treatment Diagnosis 2: Cervical radiculopathy [M54.12]                Treatment Diagnosis 3: Degenerative disc disease, cervical [M50.30]  Precautions: None. Allergies: Gluten and Wheat   TREATMENT PLAN:  Effective Dates: 2021 TO 2021. Frequency/Duration: 1-2 times a week for 6 weeks MEDICAL/REFERRING DIAGNOSIS:  Neck pain [M54.2]  Cervical radiculopathy [M54.12]  Degenerative disc disease, cervical [M50.30]   DATE OF ONSET: 2021  REFERRING PHYSICIAN: Taurus Sheppard NP MD Orders: Evaluate and Treat   Return MD Appointment: 2021     Pre-treatment Symptoms/Complaints:  Pt reports performed roughly 2 hours of upper extremity work yesterday including lifting/carrying/packing boxes; pain mostly in low back following these activities. Pain: Initial: Pain Intensity 1: 1  Pain Location 1: Neck  Pain Orientation 1: Right, Left  Post Session:  0/10   Medications Last Reviewed:  2021  Updated Objective Findings:  Posture: good, minimal rounded shoulders/forward head observed today. TREATMENT:   THERAPEUTIC EXERCISE: (23 minutes):  Exercises per grid below to improve mobility, strength and coordination. Required moderate visual, verbal, manual and tactile cues to promote proper body alignment, promote proper body posture and promote proper body mechanics. Progressed resistance, range, repetitions and complexity of movement as indicated.      Date:  2021 Date:  2021 Date:  2021   Activity/Exercise Parameters Parameters Parameters   Cervical rotation With open book x10 With open book, x20 With open book, x20   Chin tuck Supine, 10x 5sec    Chin tuck head lift, x10  Supine, 20x 5sec     Chin tuck head lift, x10  Supine, 20x 5sec     Chin tuck head lift, x10    Scapular retraction Seated 20x 20x 20x   Upper trap stretch Seated 3x30 sec Seated, 3x30 sec Seated, [id number]   Open book See above See above. x20 each   Doorway stretch [id number] 3x 30sec [id number]   TB Row Blue, 2x10 Blue, 2x10 Blue, 1x10   TB Extension Blue, 2x10 Green, 2x10 Green, 1x10   Thoracic extension with stability ball --> --> --   TRX flexion/extension x10  x10      Time spent with patient reviewing proper muscle recruitment and technique with exercises. MANUAL THERAPY: (30 minutes): Joint mobilization, Soft tissue mobilization and PROM, manual stretching, manual traction was utilized and necessary because of the patient's restricted joint motion, painful spasm, loss of articular motion and restricted motion of soft tissue   Manual traction: cervical to patient tolerance.  Manual stretching: Bilateral upper trapezius.  PROM: Rotation bilateral to patient tolerance.  Joint Mobs: Lower cervical grade II/III upglides bilaterally.  Soft tissue mobilization: cervical paraspinals, suboccipitals, Bilateral upper trap. MODALITIES: (0 minutes):      None today. HEP: As above; handouts given to patient for all exercises. Treatment/Session Summary:    · Response to Treatment:  Good tolerance to therex and manual interventions; limited range performed on TRX exercise to prevent excessive left shoulder symptoms. · Communication/Consultation:  None today  · Equipment provided today:  None today  · Recommendations/Intent for next treatment session: Next visit will focus on c spine and postural stability strengthening, c spine mobility improvement, c spine muscular flexibility improvement. Total Treatment Billable Duration:  53 mins.   PT Patient Time In/Time Out  Time In: 1005  Time Out: Lindy [id number], PT

## 2021-08-30 ENCOUNTER — HOSPITAL ENCOUNTER (OUTPATIENT)
Dept: PHYSICAL THERAPY | Age: 66
Discharge: HOME | End: 2021-08-30
Attending: NURSE PRACTITIONER
Payer: MEDICARE

## 2021-08-30 NOTE — PROGRESS NOTES
Physical Therapy  [address] [place] [address]  Date: 8/30/2021    Patient cancelled appointment today; he is being assessed for possible shingles. Plans to attend next appointment this week.       Domingo Virk DPT

## 2021-09-02 ENCOUNTER — HOSPITAL ENCOUNTER (OUTPATIENT)
Dept: PHYSICAL THERAPY | Age: 66
Discharge: HOME | End: 2021-09-02
Attending: NURSE PRACTITIONER
Payer: MEDICARE

## 2021-09-02 PROCEDURE — 97140 MANUAL THERAPY 1/> REGIONS: CPT

## 2021-09-02 PROCEDURE — 97110 THERAPEUTIC EXERCISES: CPT

## 2021-09-02 NOTE — PROGRESS NOTES
Angelika Flako  : 1955  Primary: Rafaela Joshi Medicare Choice *  Secondary:  [facility]  [address], [address] [facility], [address]  [phone]   [phone]      OUTPATIENT PHYSICAL THERAPY: Daily Treatment Note 2021    ICD-10: Treatment Diagnosis: Neck pain [M54.2]                Treatment Diagnosis 2: Cervical radiculopathy [M54.12]                Treatment Diagnosis 3: Degenerative disc disease, cervical [M50.30]  Precautions: None. Allergies: Gluten and Wheat   TREATMENT PLAN:  Effective Dates: 2021 TO 2021. Frequency/Duration: 1-2 times a week for 6 weeks MEDICAL/REFERRING DIAGNOSIS:  Neck pain [M54.2]  Cervical radiculopathy [M54.12]  Degenerative disc disease, cervical [M50.30]   DATE OF ONSET: 2021  REFERRING PHYSICIAN: Deyanira Schultz NP  MD Orders: Evaluate and Treat   Return MD Appointment: 2021     Pre-treatment Symptoms/Complaints:  Pt reports has no pain in neck and upper arms today; per pt only symptoms at this time are mild tingling in B feet. Pain: Initial: Pain Intensity 1: 0  Pain Location 1: Neck  Pain Orientation 1: Right, Left  Post Session:  0/10   Medications Last Reviewed:  2021  Updated Objective Findings:  None Today   TREATMENT:   THERAPEUTIC EXERCISE: (23 minutes):  Exercises per grid below to improve mobility, strength and coordination. Required moderate visual, verbal, manual and tactile cues to promote proper body alignment, promote proper body posture and promote proper body mechanics. Progressed resistance, range, repetitions and complexity of movement as indicated.      Date:  2021 Date:  2021 Date:  2021   Activity/Exercise Parameters Parameters Parameters   Cervical rotation With open book x10 With open book, x10 With open book, x20   Chin tuck Supine, 10x 5sec    Chin tuck head lift, x10  Supine, 10x 5sec     Chin tuck head lift, x10  Supine, 20x 5sec Chin tuck head lift, x10    Scapular retraction Seated 20x 20x 20x   Upper trap stretch Seated 3x30 sec Seated, 3x30 sec Seated, [id number]   Open book See above See above. x20 each   Doorway stretch [id number] 3x 30sec [id number]   TB Row Blue, 2x10 Blue, 3x10 Blue, 1x10   TB Extension Blue, 2x10 Blue, 3x10 Green, 1x10   Thoracic extension with stability ball --> x10 --   TRX flexion/extension x10 x10 x10      Time spent with patient reviewing proper muscle recruitment and technique with exercises. MANUAL THERAPY: (30 minutes): Joint mobilization, Soft tissue mobilization and PROM, manual stretching, manual traction was utilized and necessary because of the patient's restricted joint motion, painful spasm, loss of articular motion and restricted motion of soft tissue   Manual traction: cervical to patient tolerance.  Manual stretching: Bilateral upper trapezius.  PROM: Rotation bilateral to patient tolerance.  Joint Mobs: Lower cervical grade II/III upglides bilaterally.  Soft tissue mobilization: cervical paraspinals, suboccipitals, Bilateral upper trap. MODALITIES: (0 minutes):      None today. HEP: As above; handouts given to patient for all exercises. Treatment/Session Summary:    · Response to Treatment:  Increased manual stretching provided to address obseved increased muscle tightness left levator scap muscle today. · Communication/Consultation:  None today  · Equipment provided today:  None today  · Recommendations/Intent for next treatment session: Next visit will focus on c spine and postural stability strengthening, c spine mobility improvement, c spine muscular flexibility improvement. Total Treatment Billable Duration:  53 mins.   PT Patient Time In/Time Out  Time In: 1004  Time Out: 1059  Delon Body, PT

## 2021-09-07 ENCOUNTER — HOSPITAL ENCOUNTER (OUTPATIENT)
Dept: PHYSICAL THERAPY | Age: 66
Discharge: HOME | End: 2021-09-07
Attending: NURSE PRACTITIONER
Payer: MEDICARE

## 2021-09-07 PROCEDURE — 97140 MANUAL THERAPY 1/> REGIONS: CPT

## 2021-09-07 PROCEDURE — 97110 THERAPEUTIC EXERCISES: CPT

## 2021-09-07 NOTE — PROGRESS NOTES
Arash Bishop  : 1955  Primary: Alcides Joshi Medicare Choice *  Secondary:  [facility]  [address] [place], Suite [facility], [address]  [phone]   [phone]      OUTPATIENT PHYSICAL THERAPY: Daily Treatment Note 2021    ICD-10: Treatment Diagnosis: Neck pain [M54.2]                Treatment Diagnosis 2: Cervical radiculopathy [M54.12]                Treatment Diagnosis 3: Degenerative disc disease, cervical [M50.30]  Precautions: None. Allergies: Gluten and Wheat   TREATMENT PLAN:  Effective Dates: 2021 TO 2021. Frequency/Duration: 1-2 times a week for 6 weeks MEDICAL/REFERRING DIAGNOSIS:  Neck pain [M54.2]  Cervical radiculopathy [M54.12]  Degenerative disc disease, cervical [M50.30]   DATE OF ONSET: 2021  REFERRING PHYSICIAN: Nikita Roa NP MD Orders: Evaluate and Treat   Return MD Appointment: 2021     Pre-treatment Symptoms/Complaints:  Pt reports continued minimal to no pain in neck and upper arms. Primary complaint at this time is bilateral feet intermittent burning symtpoms. Pain: Initial: Pain Intensity 1: 0  Pain Location 1: Neck  Pain Orientation 1: Right, Left  Post Session:  0/10   Medications Last Reviewed:  2021  Updated Objective Findings:  Posture: Good; pt demonstrates ability to self correct posture throughout treatment. TREATMENT:   THERAPEUTIC EXERCISE: (23 minutes):  Exercises per grid below to improve mobility, strength and coordination. Required moderate visual, verbal, manual and tactile cues to promote proper body alignment, promote proper body posture and promote proper body mechanics. Progressed resistance, range, repetitions and complexity of movement as indicated.      Date:  2021 Date:  2021 Date:  2021   Activity/Exercise Parameters Parameters Parameters   Cervical rotation With open book x10 With open book, x10 With open book, x10   Chin tuck Supine, 10x 5sec    Chin tuck head lift, x10  Supine, 10x 5sec     Chin tuck head lift, x10  Supine, 20x 10sec     Chin tuck head lift, x10 20 sec hold   Scapular retraction Seated 20x 20x 20x   Upper trap stretch Seated 3x30 sec Seated, 3x30 sec Seated, [id number]   Open book See above See above. x20 each   Doorway stretch [id number] 3x 30sec [id number]   TB Row Blue, 2x10 Blue, 3x10 Blue, 2x10   TB Extension Blue, 2x10 Blue, 3x10 Blue, 2x10   Thoracic extension with stability ball --> x10 --   TRX flexion/extension x10 x10 x20      Time spent with patient reviewing proper muscle recruitment and technique with exercises. MANUAL THERAPY: (30 minutes): Joint mobilization, Soft tissue mobilization and PROM, manual stretching, manual traction was utilized and necessary because of the patient's restricted joint motion, painful spasm, loss of articular motion and restricted motion of soft tissue   Manual traction: cervical to patient tolerance.  Manual stretching: Bilateral upper trapezius.  PROM: Rotation bilateral to patient tolerance.  Joint Mobs: Lower cervical grade II/III upglides bilaterally.  Soft tissue mobilization: cervical paraspinals, suboccipitals, Bilateral upper trap. MODALITIES: (0 minutes):      None today. HEP: As above; handouts given to patient for all exercises. Treatment/Session Summary:    · Response to Treatment:  Pt demonstrate good understanding of exercise; min verbal cues required to improve posture during theraband strengthening exercise. · Communication/Consultation:  None today  · Equipment provided today:  None today  · Recommendations/Intent for next treatment session: Next visit will focus on c spine and postural stability strengthening, c spine mobility improvement, c spine muscular flexibility improvement. Total Treatment Billable Duration:  53 mins.   PT Patient Time In/Time Out  Time In: 1230  Time Out: [address], PT

## 2021-09-10 ENCOUNTER — HOSPITAL ENCOUNTER (OUTPATIENT)
Dept: PHYSICAL THERAPY | Age: 66
Discharge: HOME | End: 2021-09-10
Attending: NURSE PRACTITIONER
Payer: MEDICARE

## 2021-09-10 PROCEDURE — 97110 THERAPEUTIC EXERCISES: CPT

## 2021-09-10 PROCEDURE — 97140 MANUAL THERAPY 1/> REGIONS: CPT

## 2021-09-10 NOTE — THERAPY RECERTIFICATION
Sara Soto  : 1955  Primary: Toby Joshi Medicare Choice *  Secondary:  [facility] at [address], [place], [address] [place] [address]  [phone]   [phone]       OUTPATIENT PHYSICAL THERAPY:Recertification 0/10/8522    ICD-10: Treatment Diagnosis: Neck pain [M54.2]                Treatment Diagnosis 2: Cervical radiculopathy [M54.12]                Treatment Diagnosis 3: Degenerative disc disease, cervical [M50.30]                Treatment Diagnosis 4: Lumbar radiculopathy [M54.16]                Treatment Diagnosis 5: Spondylolisthesis of lumbar region [M43.16]  Precautions: None. Allergies: Gluten and Wheat   TREATMENT PLAN:  Effective Dates: 9/10/2021 TO 10/22/2021. Frequency/Duration: 1-2 times a week for 6 weeks MEDICAL/REFERRING DIAGNOSIS:  Cervicalgia [M54.2]  Radiculopathy, cervical region [M54.12]  Other cervical disc degeneration, unspecified cervical region [M50.30]   DATE OF ONSET: 2021  REFERRING PHYSICIAN: Brittney Obregon NP MD Orders: Evaluate and Treat   Return MD Appointment: 10/1/2021     INITIAL ASSESSMENT:  Mr. Gerald Vieira is a 72 y.o. male presenting to physical therapy with complaints of neck pain with radicular symptoms left upper extremity; insidious onset starting per patient Spring 2021. Pt reports underwent x-ray imaging at MD office indicating degenerative disc disease cervical spine. Pt was prescribed prednisone regiment and referred for outpatient PT treatment. Pt denies vision changes/dizziness/loss of consciousness/head ache symptoms. Pt reports some burning in bilateral feet per patient undergoing assessment for potential low back involvement vs. Peripheral neuropathy symptoms. Pt will benefit from skilled PT treatment to address deficits in cervical spine mobility/AROM, postural stability strength and functional activity tolerance.     RECERTIFICATION NOTE : Pt has completed 12 PT treatment session between 8/2/2021 to 9/10/2021. Pt demonstrate excellent progress towards functional goals at this time regarding original neck pain diagnosis and has met 4/6 of his PT goals at this time. New referral recieved by MD for lumbar radiculopathy which will be added to current case at today's visit. Goals updated to address functional limitations regarding low back pain. Primary limitations at this time include radicular burning/tingling/numbness in B feet which reduces patient ability to performed community distance ambulation and prolonged standing activities. Pt will benefit from continued skilled PT treatment to address deficits in strength, gait, balance, and functional activity tolerance to return to prior level of function and improve quality of life. PROBLEM LIST (Impacting functional limitations):  1. Decreased Strength  2. Decreased ADL/Functional Activities  3. Increased Pain  4. Decreased Activity Tolerance  5. Decreased Flexibility/Joint Mobility  6. Decreased [place] with Home Exercise Program INTERVENTIONS PLANNED: (Treatment may consist of any combination of the following)  1. Balance Exercise  2. Bed Mobility  3. Cryotherapy  4. Electrical Stimulation  5. Family Education  6. Gait Training  7. Heat  8. Home Exercise Program (HEP)  9. Manual Therapy  10. Neuromuscular Re-education/Strengthening  11. Range of Motion (ROM)  12. Therapeutic Activites  13. Therapeutic Exercise/Strengthening  14. Transcutaneous Electrical Nerve Stimulation (TENS)  15. Transfer Training  16. Ultrasound (US)     GOALS: (Goals have been discussed and agreed upon with patient.)  Short-Term Functional Goals: Time Frame: 9/10/2021 to 10/1/2021  1. Patient demonstrates independence with home exercise program without verbal cueing provided by therapist. - ongoing  2.  Pt will reports worst pain 3/10 or less in order to return to unrestricted prolonged sitting 30 mins or greater. - GOAL MET 9/10/2021  Discharge Goals: Time Frame: 9/10/2021 to 10/22/2021  1. Pt will demonstrate cervical spine AROM side bend at 45 degrees bilaterally and rotation at 60 degrees bilaterally in order to improve head turn activities including checking blind spots while driving. - GOAL MET 4/98/1251  2. Pt will demonstrate gross bilateral upper extremity strength 4+ to 5/5 in order to return to unrestricted yard work activities including mowing lawn. - GOAL MET 9/10/2021  3. Pt will report no radicular symptoms into bilateral upper extremities in order to improve reaching and lifting activities. - GOAL MET 9/10/2021  4. NDI score of 0/50 in order to demonstrate overall functional improvement. - ONGOING  5. New goal (9/10/2021): Pt will demonstrate improve flexibility per hamstring 90/90 to 35 degrees or better bilaterally in order to improve functional transfer ability. 7. New goal (9/10/2021): Pt will demonstrate gross BLE strength 4+/5 or greater in order to improve tolerance to prolonged standing. 8. New goal (9/10/2021): HANSEL score of 5/50 or less in order to demonstrate overall functional improvement. Outcome Measure: Tool Used: Neck Disability Index (NDI)  Score:  Initial: 2/50  Most Recent: X/50 (Date: -- )   Interpretation of Score: The Neck Disability Index is a revised form of the Oswestry Low Back Pain Index and is designed to measure the activities of daily living in person's with neck pain. Each section is scored on a 0-5 scale, 5 representing the greatest disability. The scores of each section are added together for a total score of 50. HANSEL score (9/10/2021): 9/50      Patient denies any increase of symptoms with coughing, sneezing or valsalva maneuver. Patient denies any headaches, changes in vision, dizziness, vertigo, nausea, drop attacks, black outs, tinnitus, dysphagia, dysarthria, LE symptoms or bowel/bladder dysfunction.     Observation/Orthostatic Postural Assessment: Posture: Good, mild rounded shoulder and forward head.    Palpation: Minimal to no tenderness bilateral upper trapezius, scalenes right greater than left. Vertebral-Basilar Screen: negative. Lumbar AROM (9/10/2021): Flex: 60 dgs ; Ext: NT ; Side bend R: 25 dgs L: 23 dgs    ROM:   Cervical extension (degrees): WNL°   Cervical flexion: WNL°   Cervical left side bend: 45°   Cervical right side bend: 40°   Cervical left rotation: 60°   Cervical right rotation: 55°     Strength: Gross BUE strength: 4 to 4+/5; gross BLE strength 4- to 4/5    Joint Mobility: Hypomobile lower c spine and upper thoracic spine; lumbar segmental mobility NT. Special Tests: Ligament stress tests performed through upper cervical spine for transverse ligament including Sharp-Ann test is WNL, and Belfast-Axis shear test is WNL. Belfast-Axis side flexion test for integrity of alar ligament is WNL. Spurling test is negative. Cervical distraction is \"feels good\". Neurovascular testing for thoracic outlet syndrome is WNL. Lumbar Special tests (9/10/2021): Hamstring 90/90: R: 42 dgs, L: 26 dgs , quadrant: R: negative, L: negative. Neurological Screen:  Myotomes: Key muscle strength testing for bilateral UE is WNL. Dermatomes: Sensation testing through bilateral upper quadrants for light touch is WNL. Reflexes: Biceps (C5), brachioradialis (C6), and triceps (C7) are WNL and WNL. Neural tension tests: Upper limb tension test is WNL. Slump test is WNL. Lower extremity neuro screen normal.    Medical Necessity:   · Patient is expected to demonstrate progress in strength, range of motion, coordination and functional technique to increase independence with head turns while driving, activities required upward gaze, and functional reaching/lifting activities. .  · Skilled intervention continues to be required due to decreased AROM, decreased strength and decreased functional activity tolerance. .  Reason for Services/Other Comments:  · Patient continues to require skilled intervention due to increasing complexity of exercise. .    Rehabilitation Potential For Stated Goals: Good  Regarding Rissa Lund's therapy, I certify that the treatment plan above will be carried out by a therapist or under their direction.   Thank you for this referral,  Shahzad Montana, PT   DPT   Referring Physician Signature: Henrique Harris, MARYANNE _______________________________ Date _____________

## 2021-09-10 NOTE — PROGRESS NOTES
Sarah Curiel  : 1955  Primary: Roseline Haywood [employer] Medicare Choice *  Secondary:  [facility]  [address], [address] [place], [place], [address]  [phone]   [phone]      OUTPATIENT PHYSICAL THERAPY: Daily Treatment Note 9/10/2021    ICD-10: Treatment Diagnosis: Neck pain [M54.2]                Treatment Diagnosis 2: Cervical radiculopathy [M54.12]                Treatment Diagnosis 3: Degenerative disc disease, cervical [M50.30]                Treatment Diagnosis 4: Lumbar radiculopathy [M54.16]                Treatment Diagnosis 5: Spondylolisthesis of lumbar region [M43.16]  Precautions: None. Allergies: Gluten and Wheat   TREATMENT PLAN:  Effective Dates: 9/10/2021 TO 10/22/2021. Frequency/Duration: 1-2 times a week for 6 weeks MEDICAL/REFERRING DIAGNOSIS:  Cervicalgia [M54.2]  Radiculopathy, cervical region [M54.12]  Other cervical disc degeneration, unspecified cervical region [M50.30]   DATE OF ONSET: 2021  REFERRING PHYSICIAN: Mu Aiken NP MD Orders: Evaluate and Treat   Return MD Appointment: 10/1/2021     Pre-treatment Symptoms/Complaints:  Pt reports saw MD who recommended continued PT with added focus on addressing low back pain; x-ray performed indicating L5 spondyloslesthesis. Pain: Initial: Pain Intensity 1: 2  Pain Location 1: Back  Pain Orientation 1: Lower  Post Session:  0/10   Medications Last Reviewed:  9/10/2021  Updated Objective Findings:  See recertification note dated 9/10/2021   TREATMENT:   THERAPEUTIC EXERCISE: (38 minutes):  Exercises per grid below to improve mobility, strength and coordination. Required moderate visual, verbal, manual and tactile cues to promote proper body alignment, promote proper body posture and promote proper body mechanics. Progressed resistance, range, repetitions and complexity of movement as indicated.      Date:  9/10/2021 Date:  2021 Date:  2021   Activity/Exercise Parameters Parameters Parameters   Cervical rotation  With open book, x10 With open book, x10   Chin tuck  Supine, 10x 5sec     Chin tuck head lift, x10  Supine, 20x 10sec     Chin tuck head lift, x10 20 sec hold   Scapular retraction  20x 20x   Upper trap stretch  Seated, 3x30 sec Seated, [id number]   Open book  See above. x20 each   Doorway stretch  3x 30sec [id number]   TB Row  Blue, 3x10 Blue, 2x10   TB Extension  Blue, 3x10 Blue, 2x10   Thoracic extension with stability ball  x10 --   TRX flexion/extension  x10 x20   Single knee to chest x10 5 sec hold     Posterior pelvic tilt with TA  x10 5 sec hold     Hamstring stretch with strap [id number] each     Sciatic nerve glide x10 each     Lower trunk rotation x10 each        Time spent with patient reviewing proper muscle recruitment and technique with exercises. MANUAL THERAPY: (15 minutes): Joint mobilization, Soft tissue mobilization and PROM, manual stretching, manual traction was utilized and necessary because of the patient's restricted joint motion, painful spasm, loss of articular motion and restricted motion of soft tissue   Manual traction: cervical to patient tolerance; not today.  Manual stretching: Bilateral upper trapezius; not today; hamstring stretching bilaterally.  PROM: Rotation bilateral to patient tolerance; not today; single knee to chest   Joint Mobs: Lower cervical grade II/III upglides bilaterally; not today   Soft tissue mobilization: cervical paraspinals, suboccipitals, Bilateral upper trap; not today. MODALITIES: (0 minutes):      None today. HEP: As above; handouts given to patient for all exercises. Treatment/Session Summary:    · Response to Treatment:  Good tolerance to manual and therex interventions focus on new treatment of low back pain. · Communication/Consultation:  HEP update with added low back exercises.   · Equipment provided today:  None today  · Recommendations/Intent for next treatment session: Next visit will focus on c spine and postural stability strengthening, c spine mobility improvement, c spine muscular flexibility improvement. Total Treatment Billable Duration:  53 mins.   PT Patient Time In/Time Out  Time In: 7414  Time Out: [facility] [facility]

## 2021-09-15 ENCOUNTER — HOSPITAL ENCOUNTER (OUTPATIENT)
Dept: PHYSICAL THERAPY | Age: 66
Discharge: HOME | End: 2021-09-15
Attending: NURSE PRACTITIONER
Payer: MEDICARE

## 2021-09-15 PROCEDURE — 97140 MANUAL THERAPY 1/> REGIONS: CPT

## 2021-09-15 PROCEDURE — 97110 THERAPEUTIC EXERCISES: CPT

## 2021-09-15 NOTE — PROGRESS NOTES
Rainey Buerger  : 1955  Primary: Renata Joshi Medicare Choice *  Secondary:  [facility]  [address] [place], [address] [place], [place], [address] [place] [address] Street  [phone]   [phone]      OUTPATIENT PHYSICAL THERAPY: Daily Treatment Note 9/15/2021    ICD-10: Treatment Diagnosis: Neck pain [M54.2]                Treatment Diagnosis 2: Cervical radiculopathy [M54.12]                Treatment Diagnosis 3: Degenerative disc disease, cervical [M50.30]                Treatment Diagnosis 4: Lumbar radiculopathy [M54.16]                Treatment Diagnosis 5: Spondylolisthesis of lumbar region [M43.16]  Precautions: None. Allergies: Gluten and Wheat   TREATMENT PLAN:  Effective Dates: 9/10/2021 TO 10/22/2021. Frequency/Duration: 1-2 times a week for 6 weeks MEDICAL/REFERRING DIAGNOSIS:  Cervicalgia [M54.2]  Radiculopathy, cervical region [M54.12]  Other cervical disc degeneration, unspecified cervical region [M50.30]   DATE OF ONSET: 2021  REFERRING PHYSICIAN: Juanoj Naranjo NP MD Orders: Evaluate and Treat   Return MD Appointment: 10/1/2021     Pre-treatment Symptoms/Complaints:  Pt reports noticed decreased radicular numbness /tingling since starting exercises for low back; HEP compliance excellent. Pain: Initial: Pain Intensity 1: 1  Pain Location 1: Back  Pain Orientation 1: Lower  Post Session:  0/10   Medications Last Reviewed:  9/15/2021  Updated Objective Findings:  Posture: Good; mild decreased lumbar lordosis in seated position observed. TREATMENT:   THERAPEUTIC EXERCISE: (38 minutes):  Exercises per grid below to improve mobility, strength and coordination. Required moderate visual, verbal, manual and tactile cues to promote proper body alignment, promote proper body posture and promote proper body mechanics. Progressed resistance, range, repetitions and complexity of movement as indicated.      Date:  9/10/2021 Date:  9/15/2021 Date:  2021 Activity/Exercise Parameters Parameters Parameters   Cervical rotation   With open book, x10   Chin tuck   Supine, 20x 10sec     Chin tuck head lift, x10 20 sec hold   Scapular retraction   20x   Upper trap stretch   Seated, [id number]   Open book   x20 each   Doorway stretch   [id number]   TB Row   Blue, 2x10   TB Extension   Blue, 2x10   Thoracic extension with stability ball   --   TRX flexion/extension   x20   Single knee to chest x10 5 sec hold 10x 5 sec hold    Posterior pelvic tilt with TA  x10 5 sec hold 10x 10 sec hold    Hamstring stretch with strap [id number] each 3x 30 sec    Sciatic nerve glide x10 each x15    Lower trunk rotation x10 each x15    Piriformis stretch  3x30 sec    TA heel lift  10x 5sec hold             Time spent with patient reviewing proper muscle recruitment and technique with exercises. MANUAL THERAPY: (15 minutes): Joint mobilization, Soft tissue mobilization and PROM, manual stretching, manual traction was utilized and necessary because of the patient's restricted joint motion, painful spasm, loss of articular motion and restricted motion of soft tissue   Manual traction: cervical to patient tolerance; not today.  Manual stretching: Bilateral upper trapezius; not today; hamstring stretching bilaterally.  PROM: Rotation bilateral to patient tolerance; not today; single knee to chest   Joint Mobs: Lower cervical grade II/III upglides bilaterally; not today   Soft tissue mobilization: cervical paraspinals, suboccipitals, Bilateral upper trap; not today. MODALITIES: (0 minutes):      None today. HEP: As above; handouts given to patient for all exercises. Treatment/Session Summary:    · Response to Treatment:  Verbal cues to ensure correct ab bracing maneuver during added core progression exercises.   · Communication/Consultation:  None today  · Equipment provided today:  None today  · Recommendations/Intent for next treatment session: Next visit will focus on c spine and postural stability strengthening, c spine mobility improvement, c spine muscular flexibility improvement. Total Treatment Billable Duration:  53 mins.   PT Patient Time In/Time Out  Time In: 1002  Time Out: Lindy 3970, PT

## 2021-09-17 ENCOUNTER — HOSPITAL ENCOUNTER (OUTPATIENT)
Dept: PHYSICAL THERAPY | Age: 66
Discharge: HOME | End: 2021-09-17
Attending: NURSE PRACTITIONER
Payer: MEDICARE

## 2021-09-17 PROCEDURE — 97140 MANUAL THERAPY 1/> REGIONS: CPT

## 2021-09-17 PROCEDURE — 97110 THERAPEUTIC EXERCISES: CPT

## 2021-09-17 NOTE — PROGRESS NOTES
Иван Ren  : 1955  Primary: Disha Joshi Medicare Choice *  Secondary:  [facility] [place] [id number] [facility], [facility], [address]  [phone]   [phone]      OUTPATIENT PHYSICAL THERAPY: Daily Treatment Note 2021    ICD-10: Treatment Diagnosis: Neck pain [M54.2]                Treatment Diagnosis 2: Cervical radiculopathy [M54.12]                Treatment Diagnosis 3: Degenerative disc disease, cervical [M50.30]                Treatment Diagnosis 4: Lumbar radiculopathy [M54.16]                Treatment Diagnosis 5: Spondylolisthesis of lumbar region [M43.16]  Precautions: None. Allergies: Gluten and Wheat   TREATMENT PLAN:  Effective Dates: 9/10/2021 TO 10/22/2021. Frequency/Duration: 1-2 times a week for 6 weeks MEDICAL/REFERRING DIAGNOSIS:  Cervicalgia [M54.2]  Radiculopathy, cervical region [M54.12]  Other cervical disc degeneration, unspecified cervical region [M50.30]   DATE OF ONSET: 2021  REFERRING PHYSICIAN: Jeff Mosher NP MD Orders: Evaluate and Treat   Return MD Appointment: 10/1/2021     Pre-treatment Symptoms/Complaints:  Pt reports mild increased soreness today unsure of cause. Pain: Initial: Pain Intensity 1: 2  Pain Location 1: Back  Pain Orientation 1: Lower  Post Session:  0/10   Medications Last Reviewed:  2021  Updated Objective Findings:  None Today. TREATMENT:   THERAPEUTIC EXERCISE: (30 minutes):  Exercises per grid below to improve mobility, strength and coordination. Required moderate visual, verbal, manual and tactile cues to promote proper body alignment, promote proper body posture and promote proper body mechanics. Progressed resistance, range, repetitions and complexity of movement as indicated.      Date:  9/10/2021 Date:  9/15/2021 Date:  2021   Activity/Exercise Parameters Parameters Parameters   Cervical rotation      Chin tuck      Scapular retraction      Upper trap stretch      Open book      Doorway stretch      TB Row      TB Extension      Thoracic extension with stability ball      TRX flexion/extension      Single knee to chest x10 5 sec hold 10x 5 sec hold 10x5 sec   Posterior pelvic tilt with TA  x10 5 sec hold 10x 10 sec hold 10x10 sec hold   Hamstring stretch with strap [id number] each 3x 30 sec 3x30 sec   Sciatic nerve glide x10 each x15 x15 each   Lower trunk rotation x10 each x15 x20    Piriformis stretch  3x30 sec 3x30 sec   TA heel lift  10x 5sec hold 15x5 sec each            Time spent with patient reviewing proper muscle recruitment and technique with exercises. MANUAL THERAPY: (23 minutes): Joint mobilization, Soft tissue mobilization and PROM, manual stretching, manual traction was utilized and necessary because of the patient's restricted joint motion, painful spasm, loss of articular motion and restricted motion of soft tissue   Manual traction: cervical to patient tolerance   Manual stretching: Bilateral upper trapezius; hamstring stretching bilaterally.  PROM: Rotation bilateral to patient tolerance; not today; single knee to chest   Joint Mobs: Lower cervical grade II/III upglides bilaterally; not today   Soft tissue mobilization: cervical paraspinals, suboccipitals, Bilateral upper trap. MODALITIES: (0 minutes):      None today. HEP: As above; handouts given to patient for all exercises. Treatment/Session Summary:    · Response to Treatment:  Good tolerance to manual and therex intervention; HEP compliance excellent, patient deonstrate excellent understanding of exerise technique  · Communication/Consultation:  None today  · Equipment provided today:  None today  · Recommendations/Intent for next treatment session: Next visit will focus on c spine and postural stability strengthening, c spine mobility improvement, c spine muscular flexibility improvement. Total Treatment Billable Duration:  53 mins.   PT Patient Time In/Time Out  Time In: 0905  Time Out: Vi 35, PT

## 2021-09-20 ENCOUNTER — HOSPITAL ENCOUNTER (OUTPATIENT)
Dept: PHYSICAL THERAPY | Age: 66
Discharge: HOME | End: 2021-09-20
Attending: NURSE PRACTITIONER
Payer: MEDICARE

## 2021-09-20 PROCEDURE — 97110 THERAPEUTIC EXERCISES: CPT

## 2021-09-20 PROCEDURE — 97140 MANUAL THERAPY 1/> REGIONS: CPT

## 2021-09-20 NOTE — PROGRESS NOTES
Arash Bishop  : 1955  Primary: Alcides Joshi Medicare Choice *  Secondary:  [facility]  [address], [address], [place], [address]  [phone]   [phone]      OUTPATIENT PHYSICAL THERAPY: Daily Treatment Note 2021    ICD-10: Treatment Diagnosis: Neck pain [M54.2]                Treatment Diagnosis 2: Cervical radiculopathy [M54.12]                Treatment Diagnosis 3: Degenerative disc disease, cervical [M50.30]                Treatment Diagnosis 4: Lumbar radiculopathy [M54.16]                Treatment Diagnosis 5: Spondylolisthesis of lumbar region [M43.16]  Precautions: None. Allergies: Gluten and Wheat   TREATMENT PLAN:  Effective Dates: 9/10/2021 TO 10/22/2021. Frequency/Duration: 1-2 times a week for 6 weeks MEDICAL/REFERRING DIAGNOSIS:  Cervicalgia [M54.2]  Radiculopathy, cervical region [M54.12]  Other cervical disc degeneration, unspecified cervical region [M50.30]   DATE OF ONSET: 2021  REFERRING PHYSICIAN: Nikita Roa NP MD Orders: Evaluate and Treat   Return MD Appointment: 10/1/2021     Pre-treatment Symptoms/Complaints:  Pt reports had catch in right side of neck yesterday that resolved by today. Pain: Initial: Pain Intensity 1: 1  Pain Location 1: Back  Pain Orientation 1: Lower  Post Session:  0/10   Medications Last Reviewed:  2021  Updated Objective Findings:  Gait: mild forward flexed trunk. .   TREATMENT:   THERAPEUTIC EXERCISE: (30 minutes):  Exercises per grid below to improve mobility, strength and coordination. Required moderate visual, verbal, manual and tactile cues to promote proper body alignment, promote proper body posture and promote proper body mechanics. Progressed resistance, range, repetitions and complexity of movement as indicated.      Date:  2021 Date:  9/15/2021 Date:  2021   Activity/Exercise Parameters Parameters Parameters   Cervical rotation      Chin tuck Scapular retraction      Upper trap stretch      Open book      Doorway stretch      TB Row      TB Extension      Thoracic extension with stability ball      TRX flexion/extension x20     Single knee to chest x10 5 sec hold 10x 5 sec hold 10x5 sec   Posterior pelvic tilt with TA  x20 10 sec hold 10x 10 sec hold 10x10 sec hold   Hamstring stretch with strap [id number] each 3x 30 sec 3x30 sec   Sciatic nerve glide x15 each x15 x15 each   Lower trunk rotation x30 each x15 x20    Piriformis stretch 3x30 sec 3x30 sec 3x30 sec   TA heel lift 15x5 sec 10x 5sec hold 15x5 sec each   Hip adduction with pullover 3 lbs, x15        Time spent with patient reviewing proper muscle recruitment and technique with exercises. MANUAL THERAPY: (23 minutes): Joint mobilization, Soft tissue mobilization and PROM, manual stretching, manual traction was utilized and necessary because of the patient's restricted joint motion, painful spasm, loss of articular motion and restricted motion of soft tissue   Manual traction: cervical to patient tolerance   Manual stretching: Bilateral upper trapezius; hamstring stretching bilaterally.  PROM: Rotation bilateral to patient tolerance; not today; single knee to chest   Joint Mobs: Lower cervical grade II/III upglides bilaterally; not today   Soft tissue mobilization: cervical paraspinals, suboccipitals, Bilateral upper trap. MODALITIES: (0 minutes):      None today. HEP: As above; handouts given to patient for all exercises. Treatment/Session Summary:    · Response to Treatment:  Good tolerance to core exercise progression. · Communication/Consultation:  HEP updated. · Equipment provided today:  None today  · Recommendations/Intent for next treatment session: Next visit will focus on c spine and postural stability strengthening, c spine mobility improvement, c spine muscular flexibility improvement. Total Treatment Billable Duration:  55 mins.   PT Patient Time In/Time Out  Time In: 0700  Time Out: 0800  Carren Kawasaki, PT

## 2021-09-28 ENCOUNTER — HOSPITAL ENCOUNTER (OUTPATIENT)
Dept: PHYSICAL THERAPY | Age: 66
Discharge: HOME | End: 2021-09-28
Attending: NURSE PRACTITIONER
Payer: MEDICARE

## 2021-09-28 NOTE — PROGRESS NOTES
Physical Therapy  [address] [place] [address]  Date: 9/28/2021    Patient out of town until later this week.       DANIEL DelgadoT

## 2021-10-01 ENCOUNTER — HOSPITAL ENCOUNTER (OUTPATIENT)
Dept: PHYSICAL THERAPY | Age: 66
Discharge: HOME | End: 2021-10-01
Attending: NURSE PRACTITIONER
Payer: MEDICARE

## 2021-10-01 PROCEDURE — 97110 THERAPEUTIC EXERCISES: CPT

## 2021-10-01 PROCEDURE — 97140 MANUAL THERAPY 1/> REGIONS: CPT

## 2021-10-01 NOTE — PROGRESS NOTES
[facility]  : 1955  Primary: Demarco Joshi Medicare Choice *  Secondary:  [facility]  [address], [address], [place], [address]  [phone]   [phone]      OUTPATIENT PHYSICAL THERAPY: Daily Treatment Note 10/1/2021    ICD-10: Treatment Diagnosis: Neck pain [M54.2]                Treatment Diagnosis 2: Cervical radiculopathy [M54.12]                Treatment Diagnosis 3: Degenerative disc disease, cervical [M50.30]                Treatment Diagnosis 4: Lumbar radiculopathy [M54.16]                Treatment Diagnosis 5: Spondylolisthesis of lumbar region [M43.16]  Precautions: None. Allergies: Gluten and Wheat   TREATMENT PLAN:  Effective Dates: 9/10/2021 TO 10/22/2021. Frequency/Duration: 1-2 times a week for 6 weeks MEDICAL/REFERRING DIAGNOSIS:  Cervicalgia [M54.2]  Radiculopathy, cervical region [M54.12]  Other cervical disc degeneration, unspecified cervical region [M50.30]   DATE OF ONSET: 2021  REFERRING PHYSICIAN: Flori Juarez NP MD Orders: Evaluate and Treat   Return MD Appointment: 10/1/2021     Pre-treatment Symptoms/Complaints:  Pt reported no neck pain just lower back and feet pain. Pt. Reported back pain worse mainly soreness and tightness. Feet are burning that comes and goes. Pain: Initial: Pain Intensity 1: 1  Pain Location 1: Back, Foot  Pain Orientation 1: Lower, Left, Right  Post Session:  0/10   Medications Last Reviewed:  10/1/2021  Updated Objective Findings:  Pt. continues to present mild trunk flexion with gait. Ashely Graven TREATMENT:   THERAPEUTIC EXERCISE: (40 minutes):  Exercises per grid below to improve mobility, strength and coordination. Required moderate visual, verbal, manual and tactile cues to promote proper body alignment, promote proper body posture and promote proper body mechanics. Progressed resistance, range, repetitions and complexity of movement as indicated.      Date:  2021 Date:  10/1/21 Date:  9/17/2021   Activity/Exercise Parameters Parameters Parameters   Cervical rotation      Chin tuck      Scapular retraction      Upper trap stretch      Open book      Doorway stretch      TB Row      TB Extension      Thoracic extension with stability ball      TRX flexion/extension x20 X 20 reps     Single knee to chest x10 5 sec hold 10x 5 sec hold 10x5 sec   Posterior pelvic tilt with TA  x20 10 sec hold X 20 reps x10 sec hold 10x10 sec hold   Hamstring stretch with strap [id number] each 3x 30 sec 3x30 sec   Sciatic nerve glide x15 each x15 x15 each   Lower trunk rotation x30 each X30 reps  x20    Piriformis stretch 3x30 sec 3x30 sec 3x30 sec   TA heel lift 15x5 sec 15x 5sec hold 15x5 sec each   Hip adduction with pullover 3 lbs, x15 In hooklying supine with squeezing ball with weight up and over his head x 15 reps with 3lb wt.s        Time spent with patient reviewing proper muscle recruitment and technique with exercises. MANUAL THERAPY: (15 minutes): Joint mobilization, Soft tissue mobilization and PROM, manual stretching, manual traction was utilized and necessary because of the patient's restricted joint motion, painful spasm, loss of articular motion and restricted motion of soft tissue   Long axis distraction BLE's in supine    Soft tissue mobilization to bilateral lumbosacral paraspinals to decrease pain and tightness. MODALITIES: (0 minutes):      None today. HEP: As above; handouts given to patient for all exercises. Treatment/Session Summary:    · Response to Treatment:  Pt. was compliant with all exercises and reported less pain after session  · Communication/Consultation:  HEP updated. · Equipment provided today:  None today  · Recommendations/Intent for next treatment session: Next visit will focus on c spine and postural stability strengthening, c spine mobility improvement, c spine muscular flexibility improvement.     Total Treatment Billable Duration:  55 mins.  PT Patient Time In/Time Out  Time In: 1430  Time Out: Lorna 45, [place]

## 2021-10-07 ENCOUNTER — HOSPITAL ENCOUNTER (OUTPATIENT)
Dept: PHYSICAL THERAPY | Age: 66
Discharge: HOME | End: 2021-10-07
Attending: NURSE PRACTITIONER
Payer: MEDICARE

## 2021-10-07 PROCEDURE — 97140 MANUAL THERAPY 1/> REGIONS: CPT

## 2021-10-07 PROCEDURE — 97110 THERAPEUTIC EXERCISES: CPT

## 2021-10-07 NOTE — PROGRESS NOTES
Annmarie Anthony  : 1955  Primary: Narda Acosta Humanabdoulaye Medicare Choice *  Secondary:  [facility]  [address], [address], [place], [address]  [phone]   [phone] -     OUTPATIENT PHYSICAL THERAPY: Daily Treatment Note 10/7/2021    ICD-10: Treatment Diagnosis: Neck pain [M54.2]                Treatment Diagnosis 2: Cervical radiculopathy [M54.12]                Treatment Diagnosis 3: Degenerative disc disease, cervical [M50.30]                Treatment Diagnosis 4: Lumbar radiculopathy [M54.16]                Treatment Diagnosis 5: Spondylolisthesis of lumbar region [M43.16]  Precautions: None. Allergies: Gluten and Wheat   TREATMENT PLAN:  Effective Dates: 9/10/2021 TO 10/22/2021. Frequency/Duration: 1-2 times a week for 6 weeks MEDICAL/REFERRING DIAGNOSIS:  Cervicalgia [M54.2]  Radiculopathy, cervical region [M54.12]  Other cervical disc degeneration, unspecified cervical region [M50.30]   DATE OF ONSET: 2021  REFERRING PHYSICIAN: Shaniqua Saba NP MD Orders: Evaluate and Treat   Return MD Appointment: 10/1/2021     Pre-treatment Symptoms/Complaints:  Pt reported some tightness with low back. Pain: Initial: Pain Intensity 1: 0  Pain Location 1: Back, Foot  Pain Orientation 1: Lower, Left, Right  Post Session:  0/10   Medications Last Reviewed:  10/7/2021  Updated Objective Findings:  Better posture noted today with gait. Angie [place] TREATMENT:   THERAPEUTIC EXERCISE: (40 minutes):  Exercises per grid below to improve mobility, strength and coordination. Required moderate visual, verbal, manual and tactile cues to promote proper body alignment, promote proper body posture and promote proper body mechanics. Progressed resistance, range, repetitions and complexity of movement as indicated.      Date:  2021 Date:  10/1/21 Date:  10/7/2021   Activity/Exercise Parameters Parameters Parameters   Cervical rotation      Chin tuck      Scapular retraction      Upper trap stretch      Open book      Doorway stretch      TB Row      TB Extension      Thoracic extension with stability ball      TRX flexion/extension x20 X 20 reps  X 30 reps    Single knee to chest x10 5 sec hold 10x 5 sec hold 4x30 sec hold bilaterally    Posterior pelvic tilt with TA  x20 10 sec hold X 20 reps x10 sec hold 20x10 sec hold   Hamstring stretch with strap [id number] each 3x 30 sec 4x30 sec Bilaterally    Sciatic nerve glide x15 each x15 X 15 reps each leg    Lower trunk rotation x30 each X30 reps  x30    Piriformis stretch 3x30 sec 3x30 sec 4x30 sec bilaterally   TA heel lift 15x5 sec 15x 5sec hold 20x5 sec each   Hip adduction with pullover 3 lbs, x15 In hooklying  with squeezing ball with weight up and over his head x 15 reps with 3lb wt.s  In hooklying with squeezing ball with weight up and over his head x 15 reps with 3 lb wt.s       Time spent with patient reviewing proper muscle recruitment and technique with exercises. MANUAL THERAPY: (15 minutes): Joint mobilization, Soft tissue mobilization and PROM, manual stretching, manual traction was utilized and necessary because of the patient's restricted joint motion, painful spasm, loss of articular motion and restricted motion of soft tissue   Long axis distraction BLE's in supine    Soft tissue mobilization to bilateral lumbosacral paraspinals to decrease pain and tightness. MODALITIES: (0 minutes):      None today. HEP: As above; handouts given to patient for all exercises. Treatment/Session Summary:    · Response to Treatment:  Pt. was compliant with all exercises and reported no increase in pain  · Communication/Consultation:  HEP updated. · Equipment provided today:  None today  · Recommendations/Intent for next treatment session: Next visit will focus on c spine and postural stability strengthening, c spine mobility improvement, c spine muscular flexibility improvement.     Total Treatment Billable Duration:  55 mins.   PT Patient Time In/Time Out  Time In: 0900  Time Out: [place], PTA

## 2021-10-08 ENCOUNTER — HOSPITAL ENCOUNTER (OUTPATIENT)
Dept: PHYSICAL THERAPY | Age: 66
Discharge: HOME | End: 2021-10-08
Attending: NURSE PRACTITIONER
Payer: MEDICARE

## 2021-10-08 PROCEDURE — 97140 MANUAL THERAPY 1/> REGIONS: CPT

## 2021-10-08 PROCEDURE — 97110 THERAPEUTIC EXERCISES: CPT

## 2021-10-08 NOTE — PROGRESS NOTES
Holden Manrique  : 1955  Primary: Radha Joshi Medicare Choice *  Secondary:  [facility]  [address], [address], [place], [address]  [phone]   [phone] -     OUTPATIENT PHYSICAL THERAPY: Daily Treatment Note 10/8/2021    ICD-10: Treatment Diagnosis: Neck pain [M54.2]                Treatment Diagnosis 2: Cervical radiculopathy [M54.12]                Treatment Diagnosis 3: Degenerative disc disease, cervical [M50.30]                Treatment Diagnosis 4: Lumbar radiculopathy [M54.16]                Treatment Diagnosis 5: Spondylolisthesis of lumbar region [M43.16]  Precautions: None. Allergies: Gluten and Wheat   TREATMENT PLAN:  Effective Dates: 9/10/2021 TO 10/22/2021. Frequency/Duration: 1-2 times a week for 6 weeks MEDICAL/REFERRING DIAGNOSIS:  Cervicalgia [M54.2]  Radiculopathy, cervical region [M54.12]  Other cervical disc degeneration, unspecified cervical region [M50.30]   DATE OF ONSET: 2021  REFERRING PHYSICIAN: Kurt Reeder NP MD Orders: Evaluate and Treat   Return MD Appointment: 10/1/2021     Pre-treatment Symptoms/Complaints:  Pt reports overall pain levels have been minimal to none at this time; per pt B foot tingling/numbness is now intermittent and only tends to increased towards end of day. Pain: Initial: Pain Intensity 1: 0  Pain Location 1: Back, Neck  Pain Orientation 1: Lower  Post Session:  0/10   Medications Last Reviewed:  10/8/2021  Updated Objective Findings:  None Today. TREATMENT:   THERAPEUTIC EXERCISE: (30 minutes):  Exercises per grid below to improve mobility, strength and coordination. Required moderate visual, verbal, manual and tactile cues to promote proper body alignment, promote proper body posture and promote proper body mechanics. Progressed resistance, range, repetitions and complexity of movement as indicated.      Date:  10/8/2021 Date:  10/1/21 Date:  10/7/2021 Activity/Exercise Parameters Parameters Parameters   Cervical rotation      Chin tuck      Scapular retraction      Upper trap stretch      Open book      Doorway stretch      TB Row      TB Extension      Thoracic extension with stability ball      TRX flexion/extension x20 X 20 reps  X 30 reps    Single knee to chest x10 5 sec hold 10x 5 sec hold 4x30 sec hold bilaterally    Posterior pelvic tilt with TA  x15 10 sec hold X 20 reps x10 sec hold 20x10 sec hold   Hamstring stretch with strap [id number] each 3x 30 sec 4x30 sec Bilaterally    Sciatic nerve glide x15 each x15 X 15 reps each leg    Lower trunk rotation x30 each X30 reps  x30    Piriformis stretch 3x30 sec 3x30 sec 4x30 sec bilaterally   TA heel lift 10x10 sec 15x 5sec hold 20x5 sec each   Hip adduction with pullover 3 lbs, x15 In hooklying  with squeezing ball with weight up and over his head x 15 reps with 3lb wt.s  In hooklying with squeezing ball with weight up and over his head x 15 reps with 3 lb wt. s    Dead bug x10 each        Time spent with patient reviewing proper muscle recruitment and technique with exercises. MANUAL THERAPY: (23 minutes): Joint mobilization, Soft tissue mobilization and PROM, manual stretching, manual traction was utilized and necessary because of the patient's restricted joint motion, painful spasm, loss of articular motion and restricted motion of soft tissue   PROM: single knee to chest bilaterally, c spine rotation to tolerance.  Joint mobs: Lower c spine upglides at C6-7 grade II/III   Manual stretching: B hamstring, B upper traps, B levator scap. MODALITIES: (0 minutes):      None today. HEP: As above; handouts given to patient for all exercises. Treatment/Session Summary:    · Response to Treatment:  Good tolerance to core exercise progression; verbal cues to ensure correct contract of TA during supine heel lift and added dead bug exercises today.   · Communication/Consultation:  HEP updated. · Equipment provided today:  None today  · Recommendations/Intent for next treatment session: Next visit will focus on c spine and postural stability strengthening, c spine mobility improvement, c spine muscular flexibility improvement. Total Treatment Billable Duration:  55 mins.   PT Patient Time In/Time Out  Time In: 1005  Time Out: 915 Kacy Mcfadden PT

## 2021-10-12 ENCOUNTER — HOSPITAL ENCOUNTER (OUTPATIENT)
Dept: PHYSICAL THERAPY | Age: 66
Discharge: HOME | End: 2021-10-12
Attending: NURSE PRACTITIONER
Payer: MEDICARE

## 2021-10-12 PROCEDURE — 97140 MANUAL THERAPY 1/> REGIONS: CPT

## 2021-10-12 PROCEDURE — 97110 THERAPEUTIC EXERCISES: CPT

## 2021-10-12 NOTE — PROGRESS NOTES
Trish Marcum  : 1955  Primary: Ted Pina [employer] Medicare Choice *  Secondary:  [facility]  [address], [address], [address]  [phone]   [phone] -     OUTPATIENT PHYSICAL THERAPY: Daily Treatment Note 10/12/2021    ICD-10: Treatment Diagnosis: Neck pain [M54.2]                Treatment Diagnosis 2: Cervical radiculopathy [M54.12]                Treatment Diagnosis 3: Degenerative disc disease, cervical [M50.30]                Treatment Diagnosis 4: Lumbar radiculopathy [M54.16]                Treatment Diagnosis 5: Spondylolisthesis of lumbar region [M43.16]  Precautions: None. Allergies: Gluten and Wheat   TREATMENT PLAN:  Effective Dates: 9/10/2021 TO 10/22/2021. Frequency/Duration: 1-2 times a week for 6 weeks MEDICAL/REFERRING DIAGNOSIS:  Cervicalgia [M54.2]  Radiculopathy, cervical region [M54.12]  Other cervical disc degeneration, unspecified cervical region [M50.30]   DATE OF ONSET: 2021  REFERRING PHYSICIAN: Argelia Shin NP MD Orders: Evaluate and Treat   Return MD Appointment: 10/1/2021     Pre-treatment Symptoms/Complaints:  Pt reported low pain level and compliant with all exercises. Pain: Initial: Pain Intensity 1: 1  Pain Location 1: Back, Foot, Neck  Pain Orientation 1: Lower, Left, Right  Post Session:  0/10   Medications Last Reviewed:  10/12/2021  Updated Objective Findings:  Pt. compliant with all exercises and increased mobility. .   TREATMENT:   THERAPEUTIC EXERCISE: (45 minutes):  Exercises per grid below to improve mobility, strength and coordination. Required moderate visual, verbal, manual and tactile cues to promote proper body alignment, promote proper body posture and promote proper body mechanics. Progressed resistance, range, repetitions and complexity of movement as indicated.      Date:  10/8/2021 Date:  10/12/21 Date:  10/7/2021   Activity/Exercise Parameters Parameters Parameters Cervical rotation      Chin tuck      Scapular retraction      Upper trap stretch      Open book      Doorway stretch      Shoulder low rows  Blue band 3x10 reps 5 sec hold     Shoulder rows   Blue band 3x10 reps 5 sec hold     Thoracic extension with stability ball  -------    TRX flexion/extension x20 X 30 reps  X 30 reps    Single knee to chest x10 5 sec hold 10x 5 sec hold 4x30 sec hold bilaterally    Posterior pelvic tilt with TA  x15 10 sec hold X 20 reps x10 sec hold 20x10 sec hold   I T band stretch  2x30 sec hold with strap     Hamstring stretch with strap [id number] each 3x 30 sec 4x30 sec Bilaterally    Sciatic nerve glide x15 each x15 X 15 reps each leg    Lower trunk rotation x30 each X30 reps  x30    Piriformis stretch 3x30 sec 3x30 sec 4x30 sec bilaterally   TA heel lift 10x10 sec 15x 5sec hold 20x5 sec each   Hip adduction with pullover 3 lbs, x15 In hooklying  with squeezing ball with weight up and over his head x 15 reps with 3lb wt.s  In hooklying with squeezing ball with weight up and over his head x 15 reps with 3 lb wt. s    Dead bug x10 reps  each 16 reps each        Time spent with patient reviewing proper muscle recruitment and technique with exercises. MANUAL THERAPY: (15 minutes): Joint mobilization, Soft tissue mobilization and PROM, manual stretching, manual traction was utilized and necessary because of the patient's restricted joint motion, painful spasm, loss of articular motion and restricted motion of soft tissue   Pt. Received soft tissue mobilization to bilateral paraspinals of entire spine to decrease pain and tightness. MODALITIES: (0 minutes):      None today. HEP: As above; handouts given to patient for all exercises. Treatment/Session Summary:    · Response to Treatment:  Pt. was compliant with all exercises and progressing well with flexibility  · Communication/Consultation:  HEP updated.   · Equipment provided today:  None today  · Recommendations/Intent for next treatment session: Next visit will focus on c spine and postural stability strengthening, c spine mobility improvement, c spine muscular flexibility improvement. Total Treatment Billable Duration:  60 mins.   PT Patient Time In/Time Out  Time In: 1100  Time Out: 3[address] [facility]

## 2021-10-14 ENCOUNTER — HOSPITAL ENCOUNTER (OUTPATIENT)
Dept: PHYSICAL THERAPY | Age: 66
Discharge: HOME | End: 2021-10-14
Attending: NURSE PRACTITIONER
Payer: MEDICARE

## 2021-10-14 PROCEDURE — 97110 THERAPEUTIC EXERCISES: CPT

## 2021-10-14 PROCEDURE — 97140 MANUAL THERAPY 1/> REGIONS: CPT

## 2021-10-14 NOTE — PROGRESS NOTES
Supa Galaviz  : 1955  Primary: Megan Joshi Medicare Choice *  Secondary:  [facility] AND [facility] - [address] [facility], [address] [place], [address] [place] [address], [address] [place] [address]  [phone]   [phone] -     OUTPATIENT PHYSICAL THERAPY: Daily Treatment Note 10/14/2021    ICD-10: Treatment Diagnosis: Neck pain [M54.2]                Treatment Diagnosis 2: Cervical radiculopathy [M54.12]                Treatment Diagnosis 3: Degenerative disc disease, cervical [M50.30]                Treatment Diagnosis 4: Lumbar radiculopathy [M54.16]                Treatment Diagnosis 5: Spondylolisthesis of lumbar region [M43.16]  Precautions: None. Allergies: Gluten and Wheat   TREATMENT PLAN:  Effective Dates: 9/10/2021 TO 10/22/2021. Frequency/Duration: 1-2 times a week for 6 weeks MEDICAL/REFERRING DIAGNOSIS:  Cervicalgia [M54.2]  Radiculopathy, cervical region [M54.12]  Other cervical disc degeneration, unspecified cervical region [M50.30]   DATE OF ONSET: 2021  REFERRING PHYSICIAN: Mary Linares NP MD Orders: Evaluate and Treat   Return MD Appointment: 10/1/2021     Pre-treatment Symptoms/Complaints:  Pt reports moderate increased pain from increased driving, lifting and carrying activities this week. Pain: Initial: Pain Intensity 1: 3  Pain Location 1: Back, Neck  Pain Orientation 1: Lower  Post Session:  1/10   Medications Last Reviewed:  10/14/2021  Updated Objective Findings:  None Today. TREATMENT:   THERAPEUTIC EXERCISE: (25 minutes):  Exercises per grid below to improve mobility, strength and coordination. Required moderate visual, verbal, manual and tactile cues to promote proper body alignment, promote proper body posture and promote proper body mechanics. Progressed resistance, range, repetitions and complexity of movement as indicated.      Date:  10/8/2021 Date:  10/12/21 Date:  10/14/2021   Activity/Exercise Parameters Parameters Parameters   Cervical rotation   -- Chin tuck   --   Scapular retraction   --   Upper trap stretch   --   Open book   --   Doorway stretch   --   Shoulder low rows  Blue band 3x10 reps 5 sec hold  --   Shoulder rows   Blue band 3x10 reps 5 sec hold  --   Thoracic extension with stability ball  ------- --   TRX flexion/extension x20 X 30 reps  --    Single knee to chest x10 5 sec hold 10x 5 sec hold 4x30 sec hold bilaterally    Posterior pelvic tilt with TA  x15 10 sec hold X 20 reps x10 sec hold 20x10 sec hold   I T band stretch  2x30 sec hold with strap  3x30 sec each   Hamstring stretch with strap [id number] each 3x 30 sec 3x30 sec Bilaterally    Sciatic nerve glide x15 each x15 --   Lower trunk rotation x30 each X30 reps  x30    Piriformis stretch 3x30 sec 3x30 sec 4x30 sec bilaterally   TA heel lift 10x10 sec 15x 5sec hold --   Hip adduction with pullover 3 lbs, x15 In hooklying  with squeezing ball with weight up and over his head x 15 reps with 3lb wt. s  --   Dead bug x10 reps  each 16 reps each  x10 reps each for beginner and advanced dead bug. Paloff press   Demo/explanation, please add next visit. Time spent with patient reviewing proper muscle recruitment and technique with exercises. MANUAL THERAPY: (30 minutes): Joint mobilization, Soft tissue mobilization and PROM, manual stretching, manual traction was utilized and necessary because of the patient's restricted joint motion, painful spasm, loss of articular motion and restricted motion of soft tissue   Soft tissue mobilization: let upper trap/levator, c spine paraspinals.  Manual stretching: B hamstrings, B glutes, left upper trap/levator scap.  PROM: single knee to chest bilaterally.  Joint mobs: lower c spine upglides left side grade III.  Manual traction: c spine to tolerance. MODALITIES: (0 minutes):      None today. HEP: As above; handouts given to patient for all exercises.     Treatment/Session Summary:    · Response to Treatment:  Increased focus on manual therapy performed today due to pt reported increaed symtpoms at start of treatment. Good response to treatment including significant decrease in pt reported pain levels post treatment. · Communication/Consultation:  None today  · Equipment provided today:  None today  · Recommendations/Intent for next treatment session: Next visit will focus on c spine and postural stability strengthening, c spine mobility improvement, c spine muscular flexibility improvement. Total Treatment Billable Duration:  55 mins.   PT Patient Time In/Time Out  Time In: 0902  Time Out: Vi 35, PT

## 2021-10-18 ENCOUNTER — HOSPITAL ENCOUNTER (OUTPATIENT)
Dept: PHYSICAL THERAPY | Age: 66
Discharge: HOME | End: 2021-10-18
Attending: NURSE PRACTITIONER
Payer: MEDICARE

## 2021-10-18 PROCEDURE — 97140 MANUAL THERAPY 1/> REGIONS: CPT

## 2021-10-18 PROCEDURE — 97110 THERAPEUTIC EXERCISES: CPT

## 2021-10-18 NOTE — PROGRESS NOTES
Lakshmi Dears  : 1955  Primary: Saloni Joshi Medicare Choice *  Secondary:  [facility]rapy [facility] at [facility]  [address] [place], [address], [place], [address]  [phone]   [phone] -     OUTPATIENT PHYSICAL THERAPY: Daily Treatment Note 10/18/2021    ICD-10: Treatment Diagnosis: Neck pain [M54.2]                Treatment Diagnosis 2: Cervical radiculopathy [M54.12]                Treatment Diagnosis 3: Degenerative disc disease, cervical [M50.30]                Treatment Diagnosis 4: Lumbar radiculopathy [M54.16]                Treatment Diagnosis 5: Spondylolisthesis of lumbar region [M43.16]  Precautions: None. Allergies: Gluten and Wheat   TREATMENT PLAN:  Effective Dates: 9/10/2021 TO 10/22/2021. Frequency/Duration: 1-2 times a week for 6 weeks MEDICAL/REFERRING DIAGNOSIS:  Cervicalgia [M54.2]  Radiculopathy, cervical region [M54.12]  Other cervical disc degeneration, unspecified cervical region [M50.30]   DATE OF ONSET: 2021  REFERRING PHYSICIAN: Nikolay Simental NP MD Orders: Evaluate and Treat   Return MD Appointment: 10/1/2021     Pre-treatment Symptoms/Complaints:  Pt reports moderate increased pain from increased driving, lifting and carrying activities this week. Pain: Initial: Pain Intensity 1: 1  Pain Location 1: Back, Foot  Pain Orientation 1: Lower, Left, Right  Post Session:  1/10   Medications Last Reviewed:  10/18/2021  Updated Objective Findings:  minor paraspinal tightness in back on both sides. TREATMENT:   THERAPEUTIC EXERCISE: (40 minutes):  Exercises per grid below to improve mobility, strength and coordination. Required moderate visual, verbal, manual and tactile cues to promote proper body alignment, promote proper body posture and promote proper body mechanics. Progressed resistance, range, repetitions and complexity of movement as indicated.      Date:  10/18/2021 Date:  10/12/21 Date:  10/14/2021   Activity/Exercise Parameters Parameters Parameters   Cervical rotation -----  --   Chin tuck   --   Scapular retraction ------  --   Upper trap stretch ------  --   Open book --------  --   Elliptical  Level 1 fwd only x 5 mins      Doorway stretch ----  --   Shoulder low rows Blue band 3x10 reps 5 sec hold  Blue band 3x10 reps 5 sec hold  --   Shoulder rows  Blue band 3x10 reps 5 sec hold  Blue band 3x10 reps 5 sec hold  --   Thoracic extension with stability ball ------- ------- --   TRX flexion/extension x20 X 30 reps  --    Single knee to chest x10 5 sec hold 10x 5 sec hold 4x30 sec hold bilaterally    Posterior pelvic tilt with TA  x15 10 sec hold X 20 reps x10 sec hold 20x10 sec hold   I T band stretch 4x30 sec hold  2x30 sec hold with strap  3x30 sec each   Hamstring stretch with strap [id number] each 3x 30 sec 3x30 sec Bilaterally    Sciatic nerve glide x15 each x15 --   Lower trunk rotation x30 each X30 reps  x30    Piriformis stretch 4x30 sec 3x30 sec 4x30 sec bilaterally   TA heel lift X 20 reps with TA 15x 5sec hold --   Hip adduction with pullover 3 lbs, x 20 in hooklying with squeezing ball with weight up and over his head In hooklying  with squeezing ball with weight up and over his head x 15 reps with 3lb wt. s  --   Dead bug X 20 reps   beginner and 2x10 reps for advanced 16 reps each  x10 reps each for beginner and advanced dead bug. Paloff press 3x10 reps green band to each side  Demo/explanation, please add next visit. Time spent with patient reviewing proper muscle recruitment and technique with exercises. MANUAL THERAPY: (15 minutes): Joint mobilization, Soft tissue mobilization and PROM, manual stretching, manual traction was utilized and necessary because of the patient's restricted joint motion, painful spasm, loss of articular motion and restricted motion of soft tissue   Soft tissue mobilization to bilateral paraspinals to entire back. MODALITIES: (0 minutes):      None today.      HEP: As above; handouts given to patient for all exercises. Treatment/Session Summary:    · Response to Treatment:  Pt. was compliant with all exercises and reported feeling much better after session. · Communication/Consultation:  None today  · Equipment provided today:  None today  · Recommendations/Intent for next treatment session: Next visit will focus on c spine and postural stability strengthening, c spine mobility improvement, c spine muscular flexibility improvement. Total Treatment Billable Duration:  55 mins.   PT Patient Time In/Time Out  Time In: 2330  Time Out: [address]

## 2021-10-21 ENCOUNTER — HOSPITAL ENCOUNTER (OUTPATIENT)
Dept: PHYSICAL THERAPY | Age: 66
Discharge: HOME | End: 2021-10-21
Attending: NURSE PRACTITIONER
Payer: MEDICARE

## 2021-10-21 PROCEDURE — 97110 THERAPEUTIC EXERCISES: CPT

## 2021-10-21 PROCEDURE — 97140 MANUAL THERAPY 1/> REGIONS: CPT

## 2021-10-21 NOTE — PROGRESS NOTES
Alison Ha  : 1955  Primary: Carry Rings [employer] Medicare Choice *  Secondary:  [facility]rapy [facility] at [facility]  [address], [address], [place], [address]  [phone]   [phone] -     OUTPATIENT PHYSICAL THERAPY: Daily Treatment Note 10/21/2021    ICD-10: Treatment Diagnosis: Neck pain [M54.2]                Treatment Diagnosis 2: Cervical radiculopathy [M54.12]                Treatment Diagnosis 3: Degenerative disc disease, cervical [M50.30]                Treatment Diagnosis 4: Lumbar radiculopathy [M54.16]                Treatment Diagnosis 5: Spondylolisthesis of lumbar region [M43.16]  Precautions: None. Allergies: Gluten and Wheat   TREATMENT PLAN:  Effective Dates: 9/10/2021 TO 10/22/2021. Frequency/Duration: 1-2 times a week for 6 weeks MEDICAL/REFERRING DIAGNOSIS:  Cervicalgia [M54.2]  Radiculopathy, cervical region [M54.12]  Other cervical disc degeneration, unspecified cervical region [M50.30]   DATE OF ONSET: 2021  REFERRING PHYSICIAN: Darlin Wilburn NP MD Orders: Evaluate and Treat   Return MD Appointment: 10/1/2021     Pre-treatment Symptoms/Complaints:  Pt reports minimal low back and foot pain at this time, Pt verbalizes confidence with ability to continued independently with HEP at this time. Pain: Initial: Pain Intensity 1: 1  Pain Location 1: Back, Foot  Pain Orientation 1: Lower  Post Session:  1/10   Medications Last Reviewed:  10/21/2021  Updated Objective Findings:  See discharge note dated 10/21/2021. Nova Galvan TREATMENT:   THERAPEUTIC EXERCISE: (45 minutes):  Exercises per grid below to improve mobility, strength and coordination. Required moderate visual, verbal, manual and tactile cues to promote proper body alignment, promote proper body posture and promote proper body mechanics. Progressed resistance, range, repetitions and complexity of movement as indicated.      Date:  10/18/2021 Date:  10/21/21 Date:  10/14/2021 Activity/Exercise Parameters Parameters Parameters   Cervical rotation -----  --   Chin tuck   --   Scapular retraction ------  --   Upper trap stretch ------  --   Open book --------  --   Elliptical  --     Doorway stretch ----  --   Shoulder low rows Blue band 3x10 reps 5 sec hold  Blue band 2x10 reps 5 sec hold  --   Shoulder rows  Blue band 3x10 reps 5 sec hold  Blue band 2x10 reps 5 sec hold  --   Thoracic extension with stability ball ------- ------- --   TRX flexion/extension x20 -- --    Single knee to chest x10 5 sec hold 10x 5 sec hold 4x30 sec hold bilaterally    Posterior pelvic tilt with TA  x15 10 sec hold X 20 reps x10 sec hold 20x10 sec hold   I T band stretch 4x30 sec hold  2x30 sec hold with strap  3x30 sec each   Hamstring stretch with strap [id number] each 3x 30 sec 3x30 sec Bilaterally    Sciatic nerve glide x15 each x15 --   Lower trunk rotation x30 each X30 reps  x30    Piriformis stretch 4x30 sec 3x30 sec 4x30 sec bilaterally   TA heel lift X 20 reps with TA 15x 5sec hold --   Hip adduction with pullover 3 lbs, x 20 in hooklying with squeezing ball with weight up and over his head In hooklying  with squeezing ball with weight up and over his head x 15 reps with 3lb wt. s  --   Dead bug X 20 reps   beginner and 2x10 reps for advanced 16 reps each  x10 reps each for beginner and advanced dead bug. Paloff press 3x10 reps green band to each side 3x10 reps blue band to each side Demo/explanation, please add next visit. Time spent with patient reviewing proper muscle recruitment and technique with exercises. MANUAL THERAPY: (10 minutes): Joint mobilization, Soft tissue mobilization and PROM, manual stretching, manual traction was utilized and necessary because of the patient's restricted joint motion, painful spasm, loss of articular motion and restricted motion of soft tissue   PROM: single knee to chest.   Manual stretching: Bilateral hamstrings, bilateral piriformis. MODALITIES: (0 minutes):      None today. HEP: As above; handouts given to patient for all exercises. Treatment/Session Summary:    · Response to Treatment:  Pt independent with exercise program at this time. · Communication/Consultation:  Final HEP handout provided. · Equipment provided today:  None today  · Recommendations/Intent for next treatment session: Discharge to HEP at this time, see discharge note dated 10/21/2021. Total Treatment Billable Duration:  55 mins.   PT Patient Time In/Time Out  Time In: 0903  Time Out: Vi 35, PT

## 2021-10-21 NOTE — THERAPY DISCHARGE
Agustin Gaston  : 1955  Primary: Lucien Joshi Medicare Choice *  Secondary:  [facility]rapy Center at [address], [place], [address] [place] [address]  [phone]   [phone]       OUTPATIENT PHYSICAL THERAPY:Discharge 10/21/2021    ICD-10: Treatment Diagnosis: Neck pain [M54.2]                Treatment Diagnosis 2: Cervical radiculopathy [M54.12]                Treatment Diagnosis 3: Degenerative disc disease, cervical [M50.30]                Treatment Diagnosis 4: Lumbar radiculopathy [M54.16]                Treatment Diagnosis 5: Spondylolisthesis of lumbar region [M43.16]  Precautions: None. Allergies: Gluten and Wheat   TREATMENT PLAN:  Effective Dates: 9/10/2021 TO 10/22/2021. Frequency/Duration: 1-2 times a week for 6 weeks MEDICAL/REFERRING DIAGNOSIS:  Cervicalgia [M54.2]  Radiculopathy, cervical region [M54.12]  Other cervical disc degeneration, unspecified cervical region [M50.30]   DATE OF ONSET: 2021  REFERRING PHYSICIAN: Ernesto Mariee NP MD Orders: Evaluate and Treat   Return MD Appointment: 10/1/2021     INITIAL ASSESSMENT:  Mr. Artemio Curry is a 77 y.o. male presenting to physical therapy with complaints of neck pain with radicular symptoms left upper extremity; insidious onset starting per patient Spring 2021. Pt reports underwent x-ray imaging at MD office indicating degenerative disc disease cervical spine. Pt was prescribed prednisone regiment and referred for outpatient PT treatment. Pt denies vision changes/dizziness/loss of consciousness/head ache symptoms. Pt reports some burning in bilateral feet per patient undergoing assessment for potential low back involvement vs. Peripheral neuropathy symptoms. Pt will benefit from skilled PT treatment to address deficits in cervical spine mobility/AROM, postural stability strength and functional activity tolerance.     DISCHARGE NOTE 10/21/2021: Pt has completed 21 PT treatment session between 8/2/2021 to 10/21/2021. Pt demonstrate all PT goals met at this time and verbalizes confidence that he can continue independently with HEP. Pt to be discharged from PT treatment at this time. PROBLEM LIST (Impacting functional limitations):  1. Decreased Strength  2. Decreased ADL/Functional Activities  3. Increased Pain  4. Decreased Activity Tolerance  5. Decreased Flexibility/Joint Mobility  6. Decreased [place] with Home Exercise Program INTERVENTIONS PLANNED: (Treatment may consist of any combination of the following)  1. Balance Exercise  2. Bed Mobility  3. Cryotherapy  4. Electrical Stimulation  5. Family Education  6. Gait Training  7. Heat  8. Home Exercise Program (HEP)  9. Manual Therapy  10. Neuromuscular Re-education/Strengthening  11. Range of Motion (ROM)  12. Therapeutic Activites  13. Therapeutic Exercise/Strengthening  14. Transcutaneous Electrical Nerve Stimulation (TENS)  15. Transfer Training  16. Ultrasound (US)     GOALS: (Goals have been discussed and agreed upon with patient.)  Short-Term Functional Goals: Time Frame: 9/10/2021 to 10/1/2021  1. Patient demonstrates independence with home exercise program without verbal cueing provided by therapist. - GOAL MET 10/21/2021  2. Pt will reports worst pain 3/10 or less in order to return to unrestricted prolonged sitting 30 mins or greater. - GOAL MET 9/10/2021  Discharge Goals: Time Frame: 9/10/2021 to 10/22/2021  1. Pt will demonstrate cervical spine AROM side bend at 45 degrees bilaterally and rotation at 60 degrees bilaterally in order to improve head turn activities including checking blind spots while driving. - GOAL MET 6/89/8701  2. Pt will demonstrate gross bilateral upper extremity strength 4+ to 5/5 in order to return to unrestricted yard work activities including mowing lawn. - GOAL MET 9/10/2021  3.  Pt will report no radicular symptoms into bilateral upper extremities in order to improve reaching and lifting activities. - GOAL MET 9/10/2021  4. NDI score of 0/50 in order to demonstrate overall functional improvement. - NT 10/21/2021  5. New goal (9/10/2021): Pt will demonstrate improve flexibility per hamstring 90/90 to 35 degrees or better bilaterally in order to improve functional transfer ability. - GOAL MET 10/21/2021  7. New goal (9/10/2021): Pt will demonstrate gross BLE strength 4+/5 or greater in order to improve tolerance to prolonged standing. - GOAL MET 10/21/2021  8. New goal (9/10/2021): HANSEL score of 5/50 or less in order to demonstrate overall functional improvement.- NT 10/21/2021    Outcome Measure: Tool Used: Neck Disability Index (NDI)  Score:  Initial: 2/50  Most Recent: X/50 (Date: -- )   Interpretation of Score: The Neck Disability Index is a revised form of the Oswestry Low Back Pain Index and is designed to measure the activities of daily living in person's with neck pain. Each section is scored on a 0-5 scale, 5 representing the greatest disability. The scores of each section are added together for a total score of 50. HANSEL score (9/10/2021): 9/50      Patient denies any increase of symptoms with coughing, sneezing or valsalva maneuver. Patient denies any headaches, changes in vision, dizziness, vertigo, nausea, drop attacks, black outs, tinnitus, dysphagia, dysarthria, LE symptoms or bowel/bladder dysfunction. Observation/Orthostatic Postural Assessment: Posture: Good, mild rounded shoulder and forward head. Palpation: Minimal to no tenderness bilateral upper trapezius, scalenes right greater than left. Vertebral-Basilar Screen: negative. Lumbar AROM (9/10/2021): Flex: 60 dgs ;  Ext: NT ; Side bend R: 25 dgs L: 23 dgs    ROM:   Cervical extension (degrees): WNL°   Cervical flexion: WNL°   Cervical left side bend: 45°   Cervical right side bend: 40°   Cervical left rotation: 60°   Cervical right rotation: 55°     Strength: Gross BUE strength: 4 to 4+/5; gross BLE strength 4- to 4/5    Joint Mobility: Hypomobile lower c spine and upper thoracic spine; lumbar segmental mobility NT. Special Tests: Ligament stress tests performed through upper cervical spine for transverse ligament including Sharp-Ann test is WNL, and Denver-Axis shear test is WNL. Denver-Axis side flexion test for integrity of alar ligament is WNL. Spurling test is negative. Cervical distraction is \"feels good\". Neurovascular testing for thoracic outlet syndrome is WNL. Lumbar Special tests (9/10/2021): Hamstring 90/90: R: 30 dgs, L: 26 dgs , quadrant: R: negative, L: negative. Neurological Screen:  Myotomes: Key muscle strength testing for bilateral UE is WNL. Dermatomes: Sensation testing through bilateral upper quadrants for light touch is WNL. Reflexes: Biceps (C5), brachioradialis (C6), and triceps (C7) are WNL and WNL. Neural tension tests: Upper limb tension test is WNL. Slump test is WNL. Lower extremity neuro screen normal.    Regarding Mike Lund's therapy, I certify that the treatment plan above will be carried out by a therapist or under their direction. Thank you for this referral,  Thomas Denver, PT   DPT   Referring Physician Signature: Giana [place] NP No Signature is Required for this note.

## 2021-11-29 ENCOUNTER — SKIN CHECK (OUTPATIENT)
Dept: URBAN - METROPOLITAN AREA CLINIC 14 | Facility: CLINIC | Age: 66
Setting detail: DERMATOLOGY
End: 2021-11-29

## 2021-11-29 DIAGNOSIS — L57.0 ACTINIC KERATOSIS: ICD-10-CM

## 2021-11-29 PROCEDURE — 99213 OFFICE O/P EST LOW 20 MIN: CPT

## 2021-11-29 PROCEDURE — 17000 DESTRUCT PREMALG LESION: CPT

## 2022-03-18 PROBLEM — Z87.891 PERSONAL HISTORY OF TOBACCO USE, PRESENTING HAZARDS TO HEALTH: Status: ACTIVE | Noted: 2020-02-04

## 2022-03-19 PROBLEM — G47.00 INSOMNIA, UNSPECIFIED: Status: ACTIVE | Noted: 2019-01-09

## 2022-03-19 PROBLEM — J43.8 OTHER EMPHYSEMA (HCC): Status: ACTIVE | Noted: 2020-01-09

## 2022-03-19 PROBLEM — R93.1 ELEVATED CORONARY ARTERY CALCIUM SCORE: Status: ACTIVE | Noted: 2020-01-27

## 2022-03-19 PROBLEM — K21.9 ESOPHAGEAL REFLUX: Status: ACTIVE | Noted: 2020-01-09

## 2022-03-19 PROBLEM — F13.99 SEDATIVE, HYPNOTIC OR ANXIOLYTIC USE, UNSPECIFIED WITH UNSPECIFIED SEDATIVE, HYPNOTIC OR ANXIOLYTIC-INDUCED DISORDER (HCC): Status: ACTIVE | Noted: 2021-07-23

## 2022-03-19 PROBLEM — R91.8 ABNORMAL CT SCAN OF LUNG: Status: ACTIVE | Noted: 2020-01-27

## 2022-03-19 PROBLEM — R91.8 ENDOBRONCHIAL MASS: Status: ACTIVE | Noted: 2020-02-04

## 2022-04-11 ENCOUNTER — HOSPITAL ENCOUNTER (OUTPATIENT)
Dept: ULTRASOUND IMAGING | Age: 67
Discharge: HOME | End: 2022-04-11
Attending: INTERNAL MEDICINE
Payer: MEDICARE

## 2022-04-11 ENCOUNTER — HOSPITAL ENCOUNTER (OUTPATIENT)
Dept: CT IMAGING | Age: 67
Discharge: HOME | End: 2022-04-11
Attending: INTERNAL MEDICINE
Payer: MEDICARE

## 2022-04-11 VITALS — BODY MASS INDEX: 19.88 KG/M2 | WEIGHT: 150 LBS | HEIGHT: 73 IN

## 2022-04-11 DIAGNOSIS — Z12.9 SCREENING FOR CANCER: ICD-10-CM

## 2022-04-11 DIAGNOSIS — Z13.6 SCREENING FOR AAA (ABDOMINAL AORTIC ANEURYSM): ICD-10-CM

## 2022-04-11 DIAGNOSIS — Z87.891 HISTORY OF TOBACCO ABUSE: ICD-10-CM

## 2022-04-11 PROCEDURE — 76706 US ABDL AORTA SCREEN AAA: CPT

## 2022-04-11 PROCEDURE — 71271 CT THORAX LUNG CANCER SCR C-: CPT

## 2022-05-27 NOTE — PROGRESS NOTES
He is a 58-year-old male seen today for follow-up of COPD with centrilobular emphysema, former history of tobacco use, incidentally discovered endobronchial lesion that turned out to be a mucous plug, usually followed by Dr. Shayla Dailey. DIAGNOSTICS:    Patient with incidental finding of endobronchial abnormality on calcium scoring scan, resolved on repeat bronc. Spirometry 1/4/2020 mild obstruction. Chest CT 3/2020no persistent endobronchial density in right mainstem bronchus although smaller density now seen the more distal bronchus intermedius of the right lung, favors benign aspirated material.  Chest CT 3/2021previously seen parabronchial lesion is no longer identified. Advanced emphysema. CPFT's 6/1/2021mild obstructive defect, negative bronchodilator response. Mild hyperinflation and gas trapping. Significantly decreased diffusion capacity.

## 2022-05-31 ENCOUNTER — OFFICE VISIT (OUTPATIENT)
Dept: PULMONOLOGY | Age: 67
End: 2022-05-31
Payer: MEDICARE

## 2022-05-31 VITALS
BODY MASS INDEX: 19.64 KG/M2 | OXYGEN SATURATION: 99 % | TEMPERATURE: 97.2 F | HEART RATE: 61 BPM | WEIGHT: 145 LBS | SYSTOLIC BLOOD PRESSURE: 116 MMHG | DIASTOLIC BLOOD PRESSURE: 72 MMHG | HEIGHT: 72 IN

## 2022-05-31 DIAGNOSIS — F17.211 CIGARETTE NICOTINE DEPENDENCE IN REMISSION: ICD-10-CM

## 2022-05-31 DIAGNOSIS — J43.8 OTHER EMPHYSEMA (HCC): ICD-10-CM

## 2022-05-31 DIAGNOSIS — J44.9 COPD, MILD (HCC): Primary | ICD-10-CM

## 2022-05-31 LAB
EXPIRATORY TIME: NORMAL
FEF 25-75% %PRED-PRE: NORMAL
FEF 25-75% PRED: NORMAL
FEF 25-75%-PRE: NORMAL
FEV1 %PRED-PRE: 83 %
FEV1 PRED: NORMAL
FEV1/FVC %PRED-PRE: NORMAL
FEV1/FVC PRED: NORMAL
FEV1/FVC: 61 %
FEV1: 3.02 L
FVC %PRED-PRE: 102 %
FVC PRED: NORMAL
FVC: 4.99 L
PEF %PRED-PRE: NORMAL
PEF PRED: NORMAL
PEF-PRE: NORMAL

## 2022-05-31 PROCEDURE — G0296 VISIT TO DETERM LDCT ELIG: HCPCS | Performed by: NURSE PRACTITIONER

## 2022-05-31 PROCEDURE — 1036F TOBACCO NON-USER: CPT | Performed by: NURSE PRACTITIONER

## 2022-05-31 PROCEDURE — G8420 CALC BMI NORM PARAMETERS: HCPCS | Performed by: NURSE PRACTITIONER

## 2022-05-31 PROCEDURE — 3023F SPIROM DOC REV: CPT | Performed by: NURSE PRACTITIONER

## 2022-05-31 PROCEDURE — 3017F COLORECTAL CA SCREEN DOC REV: CPT | Performed by: NURSE PRACTITIONER

## 2022-05-31 PROCEDURE — 94010 BREATHING CAPACITY TEST: CPT | Performed by: NURSE PRACTITIONER

## 2022-05-31 PROCEDURE — 99214 OFFICE O/P EST MOD 30 MIN: CPT | Performed by: NURSE PRACTITIONER

## 2022-05-31 PROCEDURE — G8427 DOCREV CUR MEDS BY ELIG CLIN: HCPCS | Performed by: NURSE PRACTITIONER

## 2022-05-31 PROCEDURE — 1123F ACP DISCUSS/DSCN MKR DOCD: CPT | Performed by: NURSE PRACTITIONER

## 2022-05-31 RX ORDER — FAMOTIDINE 20 MG/1
20 TABLET, FILM COATED ORAL
COMMUNITY

## 2022-05-31 RX ORDER — PANTOPRAZOLE SODIUM 40 MG/1
40 TABLET, DELAYED RELEASE ORAL DAILY
COMMUNITY

## 2022-05-31 RX ORDER — CHOLECALCIFEROL (VITAMIN D3) 125 MCG
500 CAPSULE ORAL DAILY
COMMUNITY

## 2022-05-31 ASSESSMENT — ENCOUNTER SYMPTOMS
CONSTIPATION: 0
NAUSEA: 0
ABDOMINAL PAIN: 0
SHORTNESS OF BREATH: 0
WHEEZING: 0
CHEST TIGHTNESS: 0
VOMITING: 0
DIARRHEA: 0
COUGH: 0

## 2022-05-31 ASSESSMENT — PULMONARY FUNCTION TESTS
FVC_PERCENT_PREDICTED_PRE: 102
FEV1: 3.02
FEV1_PERCENT_PREDICTED_PRE: 83
FVC: 4.99
FEV1/FVC: 61

## 2022-05-31 NOTE — PROGRESS NOTES
[address] Corinna Ramsey. [address] [facility], [address] [facility]  [phone]    Patient Name:  Seun Vick    YOB: 1955    Office Visit 5/31/2022      CHIEF COMPLAINT:      Chief Complaint   Patient presents with    COPD    Follow-up         HISTORY OF PRESENT ILLNESS:     He is a very pleasant 59-year-old male seen today for follow-up of COPD with centrilobular emphysema, history of tobacco use, incidentally discovered endobronchial lesion that turned out to be a mucous plug, usually followed by Dr. Lisandra Shha. I am seeing him today for the first time. His record is reviewed. Today, he reports that he has remained stable from a respiratory standpoint. He has shortness of breath only with strenuous activity, relieved with rest.  No cough, hemoptysis, wheezing. He remains tobacco free.     DIAGNOSTICS:     Patient with incidental finding of endobronchial abnormality on calcium scoring scan, resolved on repeat CT and clear on bronch.     Spirometry 1/4/2020- mild obstruction. Chest CT 3/2020-no persistent endobronchial density in right mainstem bronchus although smaller density now seen the more distal bronchus intermedius of the right lung, favors benign aspirated material.  Chest CT 3/2021-previously seen parabronchial lesion is no longer identified. Advanced emphysema. LDCT 4/11/2022 - Normal. The lungs are emphysematous. No lung mass or suspicious nodule. Stable scarring in the right apex. CPFT's 6/1/2021-mild obstructive defect, negative bronchodilator response. Mild hyperinflation and gas trapping. Significantly decreased diffusion capacity. Spirometry 5/31/2022- mild obstructive defect.     Past Medical History:   Diagnosis Date    Abdominal pain, epigastric     Acute upper respiratory infections of unspecified site     Celiac disease     Depression     Disturbance of skin sensation     Dyspepsia and other specified disorders of function of stomach     Generalized anxiety disorder     Hereditary spherocytosis (HCC)     Insomnia, unspecified     Unspecified hypothyroidism        Patient Active Problem List   Diagnosis    Personal history of tobacco use, presenting hazards to health    Varicose vein of leg    Depression    Other emphysema (HCC)    Abnormal CT scan of lung    Sedative, hypnotic or anxiolytic use, unspecified with unspecified sedative, hypnotic or anxiolytic-induced disorder (HCC)    Hypothyroidism    Celiac disease    Hereditary spherocytosis (HCC)    Endobronchial mass    Generalized anxiety disorder    Esophageal reflux    Elevated coronary artery calcium score    Insomnia, unspecified    Dyslipidemia         Past Surgical History:   Procedure Laterality Date    CHOLECYSTECTOMY  14    HERNIA REPAIR      TONSILLECTOMY           Social History     Socioeconomic History    Marital status:      Spouse name: None    Number of children: None    Years of education: None    Highest education level: None   Occupational History    None   Tobacco Use    Smoking status: Former Smoker     Packs/day: 1.50     Years: 38.00     Pack years: 57.00     Quit date: 2014     Years since quittin.4    Smokeless tobacco: Never Used   Substance and Sexual Activity    Alcohol use: Yes    Drug use: None    Sexual activity: None   Other Topics Concern    None   Social History Narrative    None     Social Determinants of Health     Financial Resource Strain:     Difficulty of Paying Living Expenses: Not on file   Food Insecurity:     Worried About Running Out of Food in the Last Year: Not on file    Sheron of Food in the Last Year: Not on file   Transportation Needs:     Lack of Transportation (Medical): Not on file    Lack of Transportation (Non-Medical):  Not on file   Physical Activity:     Days of Exercise per Week: Not on file    Minutes of Exercise per Session: Not on file   Stress:     Feeling of Stress : Not on file   Social Connections:     Frequency of Communication with Friends and Family: Not on file    Frequency of Social Gatherings with Friends and Family: Not on file    Attends Mosque Services: Not on file    Active Member of Clubs or Organizations: Not on file    Attends Club or Organization Meetings: Not on file    Marital Status: Not on file   Intimate Partner Violence:     Fear of Current or Ex-Partner: Not on file    Emotionally Abused: Not on file    Physically Abused: Not on file    Sexually Abused: Not on file   Housing Stability:     Unable to Pay for Housing in the Last Year: Not on file    Number of [place] in the Last Year: Not on file    Unstable Housing in the Last Year: Not on file       Family History   Problem Relation Age of Onset    Hypertension Mother     Cancer Mother         Kidney    Hypertension Father     Cancer Father         Prostate    Stroke Father        Allergies   Allergen Reactions    Gluten Meal Other (See Comments)    Wheat Bran Other (See Comments)       Current Outpatient Medications   Medication Sig    Calcium Polycarbophil (FIBER-CAPS PO) Take by mouth 2 in the morning and 2 in the evening    famotidine (PEPCID) 20 MG tablet Take 20 mg by mouth daily    pantoprazole (PROTONIX) 40 MG tablet Take 40 mg by mouth daily    hyoscyamine (LEVSIN/SL) 125 MCG sublingual tablet Place 125 mcg under the tongue every 6 hours as needed for Cramping    vitamin B-12 (CYANOCOBALAMIN) 500 MCG tablet Take 500 mcg by mouth daily    TURMERIC PO Take by mouth daily    FLUoxetine (PROZAC) 20 MG capsule Take 20 mg by mouth daily    levothyroxine (SYNTHROID) 125 MCG tablet Take 125 mcg by mouth every morning (before breakfast)    LORazepam (ATIVAN) 0.5 MG tablet Take 0.5 mg by mouth 2 times daily as needed.     mirtazapine (REMERON) 15 MG tablet Take 15 mg by mouth daily    rosuvastatin (CRESTOR) 20 MG tablet Take 20 mg by mouth every evening     No current facility-administered medications for this visit. REVIEW OF SYSTEMS:    Review of Systems   Constitutional: Negative for chills, fatigue, fever and unexpected weight change. Respiratory: Negative for cough, chest tightness, shortness of breath and wheezing. Cardiovascular: Negative for chest pain, palpitations and leg swelling. Gastrointestinal: Negative for abdominal pain, constipation, diarrhea, nausea and vomiting. Neurological: Negative for dizziness, tremors, seizures, weakness and headaches. PHYSICAL EXAM:    Vitals:    05/31/22 1105   BP: 116/72   Pulse: 61   Temp: 97.2 °F (36.2 °C)   TempSrc: Skin   SpO2: 99%   Weight: 145 lb (65.8 kg)   Height: 6' (1.829 m)        Body mass index is 19.67 kg/m². GENERAL APPEARANCE:  The patient is normal weight and in no respiratory distress. HEENT:  PERRL. Conjunctivae unremarkable. NECK/LYMPHATIC:   Symmetrical with no elevation of jugular venous pulsation. Trachea midline. LUNGS:   Normal respiratory effort with symmetrical lung expansion. Breath sounds - decreased but clear. HEART:   There is a normal rate and regular rhythm. No murmur, rub, or gallop. There is no edema in the lower extremities. NEURO:  The patient is alert and oriented to person, place, and time. Memory appears intact and mood is normal.  No gross sensorimotor deficits are present. DIAGNOSTIC TESTS: Studies were personally reviewed by me and discussed with the patient. CT LUNG SCREENING 04/11/2022    Narrative  CT chest without contrast using Low Dose Screening Technique      FINDINGS:    Mediastinum and visualized thyroid: Normal.    Heart: Normal.    Large Vessels: Normal.    Pleura: Normal.    Lungs: Normal. The lungs are emphysematous. No lung mass or suspicious nodule. Stable scarring in the right apex. Airways: No endobronchial nodule. Lymph nodes: Normal.    Bones/Soft tissues: No aggressive bone lesion.     Visualized abdomen: Status post cholecystectomy. Impression  No lung mass or suspicious nodule. L2 Benign appearance or behavior: Continue annual screening with noncontrast  chest CT using LDCT protocol in 12 months.  solid nodule(s):  <6mm OR new <4mm  part solid nodule(s):  <6mm total diameter on baseline screening  non solid nodule(s):  <30mm OR >= 30mm and unchanged or slowly growing  category 3 or 4 nodules unchanged for >= 3 months  Perifissural nodule < 10mm    Please note that a negative study does not mean the patient does not have lung  cancer. Spirometry:    Date: 01/04/20     FVC 4.45-94   FEV1 2.80-74                     ASSESSMENT:   (Medical Decision Making)                                                                                                                                          Encounter Diagnoses   Name Primary?  COPD, mild ([address] Yes    Other emphysema (HCC)     Cigarette nicotine dependence in remission      He is stable symptomatically, has shortness of breath only with strenuous activity, relieved with rest.  Today's spirometry shows ongoing improvement in FVC, there has been trend toward improvement in FEV1. Emphysematous changes noted on screening CTs. He remains tobacco free. Discussed current guidelines for screening for lung cancer, annual screening LDCT from age 50-69 or until tobacco free for 15 years. Pack-year history - 62  Tobacco status -quit in 2014  Discussed risk and benefits of screening, currently exhibits no signs and symptoms suggestive of lung cancer. Discussed importance of compliance with annual lung cancer screenings and willingness to undergo diagnosis and treatment if screening scan is positive. In addition, the patient was counseled regarding remaining tobacco free/total smoking cessation. PLAN:     Commended in remaining tobacco free and remaining active.     We will continue to monitor pulmonary function and annual screening CT. At this point, he does not require any inhalers. Follow-up in 1 year after CT, with spirometry, sooner if needed. He is up-to-date on COVID (including boosters), pneumonia and flu vaccine. Seasonal flu vaccine in the fall. Follow-up and Dispositions    · Return in about 1 year (around 2023) for MD or Savana Fitting, COPD, CT prior to appt, PFT's. Orders Placed This Encounter   Procedures    CT Lung Screen (Annual)     Age: Patient is 77 y.o. Smoking History: Social History    Tobacco Use      Smoking status: Former Smoker        Packs/day: 1.50        Years: 38.00        Pack years: 62        Quit date: 2014        Years since quittin.4      Smokeless tobacco: Never Used    Alcohol use: Yes    Drug use: Not on file   Pack years: 62    Date of last lung cancer screenin2022     Standing Status:   Future     Standing Expiration Date:   2023     Order Specific Question:   Is there documentation of shared decision making? Answer:   Yes     Order Specific Question:   Is this a low dose CT or a routine CT? Answer:   Low Dose CT [1]     Order Specific Question:   Is this the first (baseline) CT or an annual exam?     Answer: Annual [2]     Order Specific Question:   Does the patient show any signs or symptoms of lung cancer? Answer:   No     Order Specific Question:   Smoking Status? Answer: Former Smoker [4]     Order Specific Question:   Date quit smoking? (must be within 15 years)     Answer:   2014     Order Specific Question:   Smoking packs per day? Answer:   1.5     Order Specific Question:   Years smoking? Answer:   45    Spirometry Without Bronchodilator    NV VISIT TO DISCUSS LUNG CA SCREEN W LDCT         NAHEED HERNANDEZ, APRN - CNP    Total  time spent with patient - 53 min.      Over 50% of today's office visit was spent in face to face time reviewing test results, prognosis, importance of compliance, education about disease process, benefits of medications, instructions for management of acute symptoms, and follow up plans. Collaborating MD: Dr. Shawn MAE    Electronically signed. Dictated using voice recognition software.   Proof read but unrecognized errors may exist.

## 2022-05-31 NOTE — PATIENT INSTRUCTIONS
Commended in remaining tobacco free and remaining active. We will continue to monitor pulmonary function and annual screening CT. Follow-up in 1 year after CT, with spirometry, sooner if needed. He is up-to-date on COVID (including boosters), pneumonia and flu vaccine. Seasonal flu vaccine in the fall. What is lung cancer screening? Lung cancer screening is a way in which doctors check the lungs for early signs of cancer in people who have no symptoms of lung cancer. A low-dose CT scan uses much less radiation than a normal CT scan and shows a more detailed image of the lungs than a standard X-ray. The goal of lung cancer screening is to find cancer early, before it has a chance to grow, spread, or cause problems. One large study found that smokers who were screened with low-dose CT scans were less likely to die of lung cancer than those who were screened with standard X-ray. Below is a summary of the things you need to know regarding screening for lung cancer with low-dose computed tomography (LDCT). This is a screening program that involves routine annual screening with LDCT studies of the lung. The LDCTs are done using low-dose radiation that is not thought to increase your cancer risk. If you have other serious medical conditions (other cancers, congestive heart failure) that limit your life expectancy to less than 10 years, you should not undergo lung cancer screening with LDCT. The chance is 20%-60% that the LDCT result will show abnormalities. This would require additional testing which could include repeat imaging or even invasive procedures. Most (about 95%) of \"abnormal\" LDCT results are false in the sense that no lung cancer is ultimately found. Additionally, some (about 10%) of the cancers found would not affect your life expectancy, even if undetected and untreated.   If you are still smoking, the single most important thing that you can do to reduce your risk of dying of lung cancer is to quit. For this screening to be covered by Medicare and most other insurers, strict criteria must be met. If you do not meet these criteria, but still wish to undergo LDCT testing, you will be required to sign a waiver indicating your willingness to pay for the scan.

## 2022-07-20 ENCOUNTER — TELEPHONE (OUTPATIENT)
Dept: INTERNAL MEDICINE CLINIC | Facility: CLINIC | Age: 67
End: 2022-07-20

## 2022-07-20 NOTE — TELEPHONE ENCOUNTER
----- Message from Derrick Navas sent at 7/20/2022  2:28 PM EDT -----  Subject: Message to Provider    QUESTIONS  Information for Provider? PT requesting labs ordered so he can get them   done before appointment on 08/10 would like a call back once orders are   placed  ---------------------------------------------------------------------------  --------------  CALL BACK INFO  [phone]; OK to leave message on voicemail  ---------------------------------------------------------------------------  --------------  SCRIPT ANSWERS  Relationship to Patient?  Self

## 2022-08-09 SDOH — ECONOMIC STABILITY: FOOD INSECURITY: WITHIN THE PAST 12 MONTHS, THE FOOD YOU BOUGHT JUST DIDN'T LAST AND YOU DIDN'T HAVE MONEY TO GET MORE.: NEVER TRUE

## 2022-08-09 SDOH — ECONOMIC STABILITY: FOOD INSECURITY: WITHIN THE PAST 12 MONTHS, YOU WORRIED THAT YOUR FOOD WOULD RUN OUT BEFORE YOU GOT MONEY TO BUY MORE.: NEVER TRUE

## 2022-08-09 ASSESSMENT — PATIENT HEALTH QUESTIONNAIRE - PHQ9
SUM OF ALL RESPONSES TO PHQ9 QUESTIONS 1 & 2: 0
SUM OF ALL RESPONSES TO PHQ QUESTIONS 1-9: 0
SUM OF ALL RESPONSES TO PHQ QUESTIONS 1-9: 0
2. FEELING DOWN, DEPRESSED OR HOPELESS: 0
SUM OF ALL RESPONSES TO PHQ QUESTIONS 1-9: 0
1. LITTLE INTEREST OR PLEASURE IN DOING THINGS: 0
SUM OF ALL RESPONSES TO PHQ QUESTIONS 1-9: 0

## 2022-08-09 ASSESSMENT — SOCIAL DETERMINANTS OF HEALTH (SDOH): HOW HARD IS IT FOR YOU TO PAY FOR THE VERY BASICS LIKE FOOD, HOUSING, MEDICAL CARE, AND HEATING?: NOT HARD AT ALL

## 2022-08-09 ASSESSMENT — LIFESTYLE VARIABLES: HOW OFTEN DO YOU HAVE A DRINK CONTAINING ALCOHOL: NEVER

## 2022-08-10 ENCOUNTER — OFFICE VISIT (OUTPATIENT)
Dept: INTERNAL MEDICINE CLINIC | Facility: CLINIC | Age: 67
End: 2022-08-10
Payer: COMMERCIAL

## 2022-08-10 VITALS
WEIGHT: 145.8 LBS | BODY MASS INDEX: 19.75 KG/M2 | OXYGEN SATURATION: 97 % | TEMPERATURE: 96.8 F | RESPIRATION RATE: 16 BRPM | DIASTOLIC BLOOD PRESSURE: 70 MMHG | SYSTOLIC BLOOD PRESSURE: 130 MMHG | HEIGHT: 72 IN | HEART RATE: 61 BPM

## 2022-08-10 DIAGNOSIS — R53.82 CHRONIC FATIGUE: ICD-10-CM

## 2022-08-10 DIAGNOSIS — J44.9 COPD, MILD (HCC): Primary | ICD-10-CM

## 2022-08-10 DIAGNOSIS — N40.0 BENIGN PROSTATIC HYPERPLASIA WITHOUT LOWER URINARY TRACT SYMPTOMS: ICD-10-CM

## 2022-08-10 DIAGNOSIS — E03.9 ACQUIRED HYPOTHYROIDISM: ICD-10-CM

## 2022-08-10 DIAGNOSIS — F32.4 MAJOR DEPRESSIVE DISORDER, SINGLE EPISODE, IN PARTIAL REMISSION (HCC): ICD-10-CM

## 2022-08-10 DIAGNOSIS — D58.0 HEREDITARY SPHEROCYTOSIS (HCC): ICD-10-CM

## 2022-08-10 DIAGNOSIS — K21.9 GASTROESOPHAGEAL REFLUX DISEASE WITHOUT ESOPHAGITIS: ICD-10-CM

## 2022-08-10 DIAGNOSIS — Z00.00 INITIAL MEDICARE ANNUAL WELLNESS VISIT: ICD-10-CM

## 2022-08-10 DIAGNOSIS — K90.0 CELIAC DISEASE: ICD-10-CM

## 2022-08-10 DIAGNOSIS — E78.2 MIXED HYPERLIPIDEMIA: ICD-10-CM

## 2022-08-10 DIAGNOSIS — F13.99 SEDATIVE, HYPNOTIC OR ANXIOLYTIC USE, UNSPECIFIED WITH UNSPECIFIED SEDATIVE, HYPNOTIC OR ANXIOLYTIC-INDUCED DISORDER (HCC): ICD-10-CM

## 2022-08-10 DIAGNOSIS — J43.8 OTHER EMPHYSEMA (HCC): ICD-10-CM

## 2022-08-10 DIAGNOSIS — R93.1 ELEVATED CORONARY ARTERY CALCIUM SCORE: ICD-10-CM

## 2022-08-10 DIAGNOSIS — D12.6 ADENOMATOUS POLYP OF COLON, UNSPECIFIED PART OF COLON: ICD-10-CM

## 2022-08-10 PROBLEM — R91.8 ENDOBRONCHIAL MASS: Status: RESOLVED | Noted: 2020-02-04 | Resolved: 2022-08-10

## 2022-08-10 PROCEDURE — 1123F ACP DISCUSS/DSCN MKR DOCD: CPT | Performed by: INTERNAL MEDICINE

## 2022-08-10 PROCEDURE — G0438 PPPS, INITIAL VISIT: HCPCS | Performed by: INTERNAL MEDICINE

## 2022-08-10 RX ORDER — LORAZEPAM 1 MG/1
1 TABLET ORAL 2 TIMES DAILY PRN
COMMUNITY
Start: 2022-08-09

## 2022-08-10 RX ORDER — MIRTAZAPINE 7.5 MG/1
7.5 TABLET, FILM COATED ORAL NIGHTLY
COMMUNITY
Start: 2022-07-03

## 2022-08-10 ASSESSMENT — ENCOUNTER SYMPTOMS
SHORTNESS OF BREATH: 1
ABDOMINAL PAIN: 0

## 2022-08-10 NOTE — PROGRESS NOTES
8/10/2022 10:26 AM  Location:[facility] [address] [facility]  [place]  Patient #:  [id number]  YOB: 1955          YOUR LAST HEMOGLOBIN A1CS:   No results found for: HBA1C, [id number]    YOUR LAST LIPID PROFILE:   Lab Results   Component Value Date/Time    CHOL 130 02/03/2022 08:18 AM    HDL 36 02/03/2022 08:18 AM    VLDL 16 02/03/2022 08:18 AM         Lab Results   Component Value Date/Time    GFRAA 96 02/03/2022 08:18 AM    BUN 16 02/03/2022 08:18 AM     02/03/2022 08:18 AM    K 4.2 02/03/2022 08:18 AM     02/03/2022 08:18 AM    CO2 25 02/03/2022 08:18 AM           History of Present Illness     Chief Complaint   Patient presents with    Medicare AWV     Initial     6 Month Follow-Up     Pt presents to the office today for a 6 month follow-up     COPD    Immunizations     When should he consider getting a flu shot     This patient states he is doing relatively well. He is followed by psychiatry and his medications are working well for his depression at this time. His mother is in a nursing facility which has been stressful for him. Previous labs done earlier this year were normal including thyroid studies.   Mr. Inder Price is a 77 y.o. male  who presents for annual visit      Allergies   Allergen Reactions    Gluten Meal Other (See Comments)    Wheat Bran Other (See Comments)     Past Medical History:   Diagnosis Date    Abdominal pain, epigastric     Acute upper respiratory infections of unspecified site     Celiac disease     Depression     Disturbance of skin sensation     Dyspepsia and other specified disorders of function of stomach     Generalized anxiety disorder     Hereditary spherocytosis ([facility] [address]     Insomnia, unspecified     Unspecified hypothyroidism      Social History     Socioeconomic History    Marital status:      Spouse name: None    Number of children: None    Years of education: None    Highest education level: None   Tobacco Use Date Due    Annual Wellness Visit (AWV)  07/24/2022    Flu vaccine (1) 09/01/2022    Lipids  02/03/2023    Prostate Specific Antigen (PSA) Screening or Monitoring  02/03/2023    Low dose CT lung screening  04/11/2023    Depression Monitoring  08/09/2023    DTaP/Tdap/Td vaccine (2 - Td or Tdap) 06/06/2027    Colorectal Cancer Screen  10/14/2029    Shingles vaccine  Completed    Pneumococcal 65+ years Vaccine  Completed    COVID-19 Vaccine  Completed    AAA screen  Completed    Hepatitis C screen  Completed    Hepatitis A vaccine  Aged Out    Hepatitis B vaccine  Aged Out    Hib vaccine  Aged Out    Meningococcal (ACWY) vaccine  Aged Out     Family History   Problem Relation Age of Onset    Hypertension Mother     Cancer Mother         Kidney    Hypertension Father     Cancer Father         Prostate    Stroke Father              Review of Systems  Review of Systems   Constitutional:  Positive for fatigue. Respiratory:  Positive for shortness of breath. Cardiovascular: Negative. Gastrointestinal:  Negative for abdominal pain. Endocrine: Negative. Genitourinary: Negative. Skin: Negative. Neurological: Negative. Hematological: Negative. Psychiatric/Behavioral:  Positive for dysphoric mood. /70 (Site: Right Upper Arm, Position: Sitting, Cuff Size: Large Adult)   Pulse 61   Temp 96.8 °F (36 °C) (Temporal)   Resp 16   Ht 6' (1.829 m)   Wt 145 lb 12.8 oz (66.1 kg)   SpO2 97% Comment: ra  BMI 19.77 kg/m²       Physical Exam    Physical Exam  Constitutional:       General: He is not in acute distress. Appearance: Normal appearance. He is not ill-appearing. HENT:      Head: Normocephalic and atraumatic. Eyes:      Extraocular Movements: Extraocular movements intact. Pupils: Pupils are equal, round, and reactive to light. Cardiovascular:      Rate and Rhythm: Normal rate and regular rhythm.    Pulmonary:      Effort: Pulmonary effort is normal.      Breath sounds: Normal breath sounds. Skin:     General: Skin is warm. Neurological:      Mental Status: He is alert. Motor: No weakness. Psychiatric:         Mood and Affect: Mood normal.         Behavior: Behavior normal.         Thought Content: Thought content normal.         Judgment: Judgment normal.       Assessment & Plan    Encounter Diagnoses   Name Primary? COPD, mild ([facility] [address] Yes    Comment: stable followed by pulmonary medicine     Elevated coronary artery calcium score     Major depressive disorder, single episode, in partial remission (HCC)     Sedative, hypnotic or anxiolytic use, unspecified with unspecified sedative, hypnotic or anxiolytic-induced disorder ([facility] [address]     Hereditary spherocytosis ([facility] [address]     Other emphysema ([facility] [address]     Gastroesophageal reflux disease without esophagitis     Comment: has had dilatation     Celiac disease     Acquired hypothyroidism     Chronic fatigue     Mixed hyperlipidemia     Benign prostatic hyperplasia without lower urinary tract symptoms     Adenomatous polyp of colon, unspecified part of colon     Comment: due for colonoscopy 10/22        Current Outpatient Medications   Medication Sig Dispense Refill    mirtazapine (REMERON) 7.5 MG tablet Take 7.5 mg by mouth nightly      Multiple Vitamins-Minerals (MULTIVITAMIN GUMMIES ADULT PO) Take by mouth daily      LORazepam (ATIVAN) 1 MG tablet Take 1 mg by mouth 2 times daily as needed for Anxiety.       Calcium Polycarbophil (FIBER-CAPS PO) Take by mouth 2 in the morning and 2 in the evening      famotidine (PEPCID) 20 MG tablet Take 20 mg by mouth daily      pantoprazole (PROTONIX) 40 MG tablet Take 40 mg by mouth daily      hyoscyamine (LEVSIN/SL) 125 MCG sublingual tablet Place 125 mcg under the tongue every 6 hours as needed for Cramping      vitamin B-12 (CYANOCOBALAMIN) 500 MCG tablet Take 500 mcg by mouth daily      TURMERIC PO Take by mouth daily      FLUoxetine (PROZAC) 20 MG capsule Take 20 mg by mouth daily      levothyroxine (SYNTHROID) 125 MCG tablet Take 125 mcg by mouth every morning (before breakfast)      mirtazapine (REMERON) 15 MG tablet Take 15 mg by mouth daily      rosuvastatin (CRESTOR) 20 MG tablet Take 20 mg by mouth every evening       No current facility-administered medications for this visit. Orders Placed This Encounter   Procedures    Comprehensive Metabolic Panel     Standing Status:   Future     Standing Expiration Date:   8/10/2023    CBC with Auto Differential     Standing Status:   Future     Standing Expiration Date:   8/10/2023    Lipid Panel     Standing Status:   Future     Standing Expiration Date:   8/10/2023    PSA, Diagnostic     Standing Status:   Future     Standing Expiration Date:   8/10/2023    TSH     Standing Status:   Future     Standing Expiration Date:   8/10/2023       Medications Discontinued During This Encounter   Medication Reason    LORazepam (ATIVAN) 0.5 MG tablet LIST CLEANUP       1. COPD, mild ([address]  Followed by pulmonary medicine apparently stable    2. Elevated coronary artery calcium score       3. Major depressive disorder, single episode, in partial remission [facility]  Psychiatry doing well on present meds    4. Sedative, hypnotic or anxiolytic use, unspecified with unspecified sedative, hypnotic or anxiolytic-induced disorder ([address]  On present medications    5. Hereditary spherocytosis (HCC)  Issues noted    6. Other emphysema ([address]  By pulmonary medicine stable    7. Gastroesophageal reflux disease without esophagitis       8. Celiac disease  Stable    9. Acquired hypothyroidism  Last TSH was normal   - TSH; Future    10. Chronic fatigue     - Comprehensive Metabolic Panel; Future  - CBC with Auto Differential; Future    11. Mixed hyperlipidemia     - Lipid Panel; Future    12. Benign prostatic hyperplasia without lower urinary tract symptoms     - PSA, Diagnostic; Future    13.  Adenomatous polyp of colon, unspecified part of colon  Contact gastroenterology if in regards to his follow-up colonoscopy due later this year      No follow-up provider specified. Silke Anaya MD    Medicare Annual Wellness Visit    Teri Ron is here for Medicare AWV (Initial ), 6 Month Follow-Up (Pt presents to the office today for a 6 month follow-up ), COPD, and Immunizations (When should he consider getting a flu shot)    Assessment & Plan   COPD, mild ([address]  Comments:  stable followed by pulmonary medicine  Elevated coronary artery calcium score  Major depressive disorder, single episode, in partial remission (HCC)  Sedative, hypnotic or anxiolytic use, unspecified with unspecified sedative, hypnotic or anxiolytic-induced disorder ([address]  Hereditary spherocytosis ([address]  Other emphysema ([address]  Gastroesophageal reflux disease without esophagitis  Comments:  has had dilatation  Celiac disease  Acquired hypothyroidism  -     TSH; Future  Chronic fatigue  -     Comprehensive Metabolic Panel; Future  -     CBC with Auto Differential; Future  Mixed hyperlipidemia  -     Lipid Panel; Future  Benign prostatic hyperplasia without lower urinary tract symptoms  -     PSA, Diagnostic; Future  Adenomatous polyp of colon, unspecified part of colon  Comments:  due for colonoscopy 10/22  Initial Medicare annual wellness visit    Recommendations for Preventive Services Due: see orders and patient instructions/AVS.  Recommended screening schedule for the next 5-10 years is provided to the patient in written form: see Patient Instructions/AVS.     Return in about 6 months (around 2/10/2023). Subjective   The following acute and/or chronic problems were also addressed today:   Diagnosis Orders   1. COPD, mild ([address]      stable followed by pulmonary medicine      2. Elevated coronary artery calcium score        3. Major depressive disorder, single episode, in partial remission ([address]        4.  Sedative, hypnotic or anxiolytic use, unspecified with unspecified sedative, hypnotic or anxiolytic-induced disorder ([facility] [address]        5. Hereditary spherocytosis ([facility] [address]        6. Other emphysema ([facility] [address]        7. Gastroesophageal reflux disease without esophagitis      has had dilatation      8. Celiac disease        9. Acquired hypothyroidism  TSH      10. Chronic fatigue  Comprehensive Metabolic Panel    CBC with Auto Differential      11. Mixed hyperlipidemia  Lipid Panel      12. Benign prostatic hyperplasia without lower urinary tract symptoms  PSA, Diagnostic      13. Adenomatous polyp of colon, unspecified part of colon      due for colonoscopy 10/22      14. Initial Medicare annual wellness visit              Patient's complete Health Risk Assessment and screening values have been reviewed and are found in Flowsheets. The following problems were reviewed today and where indicated follow up appointments were made and/or referrals ordered. Positive Risk Factor Screenings with Interventions:             General Health and ACP:  General  In general, how would you say your health is?: Good  In the past 7 days, have you experienced any of the following: New or Increased Pain, New or Increased Fatigue, Loneliness, Social Isolation, Stress or Anger?: No  Do you get the social and emotional support that you need?: Yes  Do you have a Living Will?: (!) No    Advance Directives       Power of  Living Will ACP-Advance Directive ACP-Power of     Not on File Not on File Not on File Not on File          General Health Risk Interventions:  stable              Objective   Vitals:    08/10/22 1003   BP: 130/70   Site: Right Upper Arm   Position: Sitting   Cuff Size: Large Adult   Pulse: 61   Resp: 16   Temp: 96.8 °F (36 °C)   TempSrc: Temporal   SpO2: 97%   Weight: 145 lb 12.8 oz (66.1 kg)   Height: 6' (1.829 m)      Body mass index is 19.77 kg/m².       done       Allergies   Allergen Reactions    Gluten Meal Other (See Comments)    Wheat Bran Other (See Comments)     Prior to Visit Medications Medication Sig Taking? Authorizing Provider   mirtazapine (REMERON) 7.5 MG tablet Take 7.5 mg by mouth nightly Yes Historical Provider, MD   Multiple Vitamins-Minerals (MULTIVITAMIN GUMMIES ADULT PO) Take by mouth daily Yes Historical Provider, MD   LORazepam (ATIVAN) 1 MG tablet Take 1 mg by mouth 2 times daily as needed for Anxiety.  Yes Historical Provider, MD   Calcium Polycarbophil (FIBER-CAPS PO) Take by mouth 2 in the morning and 2 in the evening Yes Historical Provider, MD   famotidine (PEPCID) 20 MG tablet Take 20 mg by mouth daily Yes Historical Provider, MD   pantoprazole (PROTONIX) 40 MG tablet Take 40 mg by mouth daily Yes Historical Provider, MD   hyoscyamine (LEVSIN/SL) 125 MCG sublingual tablet Place 125 mcg under the tongue every 6 hours as needed for Cramping Yes Historical Provider, MD   vitamin B-12 (CYANOCOBALAMIN) 500 MCG tablet Take 500 mcg by mouth daily Yes Historical Provider, MD   TURMERIC PO Take by mouth daily Yes Ar Automatic Reconciliation   FLUoxetine (PROZAC) 20 MG capsule Take 20 mg by mouth daily Yes Ar Automatic Reconciliation   levothyroxine (SYNTHROID) 125 MCG tablet Take 125 mcg by mouth every morning (before breakfast) Yes Ar Automatic Reconciliation   mirtazapine (REMERON) 15 MG tablet Take 15 mg by mouth daily Yes Ar Automatic Reconciliation   rosuvastatin (CRESTOR) 20 MG tablet Take 20 mg by mouth every evening Yes Ar Automatic Reconciliation       CareTeam (Including outside providers/suppliers regularly involved in providing care):   Patient Care Team:  Gretel Avendano MD as PCP - Sallie Zhang MD as PCP - Annamarie Roldanled Provider     Reviewed and updated this visit:  Tobacco  Allergies  Meds  Med Hx  Surg Hx  Soc Hx  Fam Hx

## 2022-08-10 NOTE — PATIENT INSTRUCTIONS
Personalized Preventive Plan for Keena Soto - 8/10/2022  Medicare offers a range of preventive health benefits. Some of the tests and screenings are paid in full while other may be subject to a deductible, co-insurance, and/or copay. Some of these benefits include a comprehensive review of your medical history including lifestyle, illnesses that may run in your family, and various assessments and screenings as appropriate. After reviewing your medical record and screening and assessments performed today your provider may have ordered immunizations, labs, imaging, and/or referrals for you. A list of these orders (if applicable) as well as your Preventive Care list are included within your After Visit Summary for your review. Other Preventive Recommendations:    A preventive eye exam performed by an eye specialist is recommended every 1-2 years to screen for glaucoma; cataracts, macular degeneration, and other eye disorders. A preventive dental visit is recommended every 6 months. Try to get at least 150 minutes of exercise per week or 10,000 steps per day on a pedometer . Order or download the FREE \"Exercise & Physical Activity: Your Everyday Guide\" from [employer]. Call 2-896.418.1898 or search [employer] online. You need 5162-3815 mg of calcium and 8133-5924 IU of vitamin D per day. It is possible to meet your calcium requirement with diet alone, but a vitamin D supplement is usually necessary to meet this goal.  When exposed to the sun, use a sunscreen that protects against both UVA and UVB radiation with an SPF of 30 or greater. Reapply every 2 to 3 hours or after sweating, drying off with a towel, or swimming. Always wear a seat belt when traveling in a car. Always wear a helmet when riding a bicycle or motorcycle.

## 2022-11-29 ENCOUNTER — APPOINTMENT (OUTPATIENT)
Dept: URBAN - METROPOLITAN AREA CLINIC 210 | Age: 67
Setting detail: DERMATOLOGY
End: 2022-12-04

## 2022-11-29 DIAGNOSIS — L57.8 OTHER SKIN CHANGES DUE TO CHRONIC EXPOSURE TO NONIONIZING RADIATION: ICD-10-CM

## 2022-11-29 DIAGNOSIS — D69.2 OTHER NONTHROMBOCYTOPENIC PURPURA: ICD-10-CM

## 2022-11-29 DIAGNOSIS — L82.1 OTHER SEBORRHEIC KERATOSIS: ICD-10-CM

## 2022-11-29 DIAGNOSIS — D18.0 HEMANGIOMA: ICD-10-CM

## 2022-11-29 DIAGNOSIS — B35.1 TINEA UNGUIUM: ICD-10-CM

## 2022-11-29 DIAGNOSIS — L57.0 ACTINIC KERATOSIS: ICD-10-CM

## 2022-11-29 DIAGNOSIS — L81.4 OTHER MELANIN HYPERPIGMENTATION: ICD-10-CM

## 2022-11-29 DIAGNOSIS — Z12.83 ENCOUNTER FOR SCREENING FOR MALIGNANT NEOPLASM OF SKIN: ICD-10-CM

## 2022-11-29 PROBLEM — D18.01 HEMANGIOMA OF SKIN AND SUBCUTANEOUS TISSUE: Status: ACTIVE | Noted: 2022-11-29

## 2022-11-29 PROCEDURE — OTHER MIPS QUALITY: OTHER

## 2022-11-29 PROCEDURE — OTHER LIQUID NITROGEN: OTHER

## 2022-11-29 PROCEDURE — OTHER PRESCRIPTION: OTHER

## 2022-11-29 PROCEDURE — OTHER COUNSELING: OTHER

## 2022-11-29 PROCEDURE — 99213 OFFICE O/P EST LOW 20 MIN: CPT | Mod: 25

## 2022-11-29 PROCEDURE — 17000 DESTRUCT PREMALG LESION: CPT

## 2022-11-29 RX ORDER — CICLOPIROX OLAMINE 7.7 MG/100ML
SUSPENSION TOPICAL
Qty: 30 | Refills: 0 | Status: ERX | COMMUNITY
Start: 2022-11-29

## 2022-11-29 ASSESSMENT — LOCATION SIMPLE DESCRIPTION DERM
LOCATION SIMPLE: RIGHT EAR
LOCATION SIMPLE: RIGHT FOREARM
LOCATION SIMPLE: RIGHT CHEEK
LOCATION SIMPLE: UPPER BACK
LOCATION SIMPLE: RIGHT GREAT TOE
LOCATION SIMPLE: LEFT FOREARM
LOCATION SIMPLE: RIGHT UPPER ARM
LOCATION SIMPLE: LEFT CHEEK
LOCATION SIMPLE: RIGHT UPPER BACK
LOCATION SIMPLE: CHEST
LOCATION SIMPLE: RIGHT GREAT TOE
LOCATION SIMPLE: LEFT UPPER ARM

## 2022-11-29 ASSESSMENT — LOCATION DETAILED DESCRIPTION DERM
LOCATION DETAILED: LEFT LATERAL SUPERIOR CHEST
LOCATION DETAILED: RIGHT TRAGUS
LOCATION DETAILED: LEFT SUPERIOR LATERAL MALAR CHEEK
LOCATION DETAILED: LEFT DISTAL POSTERIOR UPPER ARM
LOCATION DETAILED: RIGHT DISTAL PLANTAR GREAT TOE
LOCATION DETAILED: LEFT MEDIAL INFERIOR CHEST
LOCATION DETAILED: RIGHT INFERIOR MEDIAL UPPER BACK
LOCATION DETAILED: RIGHT SUPERIOR UPPER BACK
LOCATION DETAILED: LEFT DISTAL DORSAL FOREARM
LOCATION DETAILED: INFERIOR THORACIC SPINE
LOCATION DETAILED: RIGHT GREAT TOENAIL
LOCATION DETAILED: RIGHT DISTAL POSTERIOR UPPER ARM
LOCATION DETAILED: RIGHT SUPERIOR LATERAL MALAR CHEEK
LOCATION DETAILED: RIGHT PROXIMAL DORSAL FOREARM
LOCATION DETAILED: LEFT PROXIMAL DORSAL FOREARM

## 2022-11-29 ASSESSMENT — LOCATION ZONE DERM
LOCATION ZONE: ARM
LOCATION ZONE: TOE
LOCATION ZONE: FACE
LOCATION ZONE: TRUNK
LOCATION ZONE: EAR
LOCATION ZONE: TOENAIL

## 2022-11-29 NOTE — HPI: EVALUATION OF SKIN LESION(S)
What Type Of Note Output Would You Prefer (Optional)?: Bullet Format
Hpi Title: Evaluation of Skin Lesions
SURG

## 2022-11-29 NOTE — PROCEDURE: MIPS QUALITY
Quality 110: Preventive Care And Screening: Influenza Immunization: Influenza Immunization Administered during Influenza season
Quality 226: Preventive Care And Screening: Tobacco Use: Screening And Cessation Intervention: Patient screened for tobacco use and is an ex/non-smoker
Quality 111:Pneumonia Vaccination Status For Older Adults: Pneumococcal vaccine (PPSV23) administered on or after patient’s 60th birthday and before the end of the measurement period
Detail Level: Detailed
Quality 130: Documentation Of Current Medications In The Medical Record: Current Medications Documented
Quality 402: Tobacco Use And Help With Quitting Among Adolescents: Patient screened for tobacco and is an ex-smoker
Quality 431: Preventive Care And Screening: Unhealthy Alcohol Use - Screening: Patient not identified as an unhealthy alcohol user when screened for unhealthy alcohol use using a systematic screening method

## 2022-11-29 NOTE — PROCEDURE: LIQUID NITROGEN
Post-Care Instructions: I reviewed with the patient in detail post-care instructions. Patient is to wear sunprotection, and avoid picking at any of the treated lesions. Pt may apply Vaseline to crusted or scabbing areas.
Consent: The patient's consent was obtained including but not limited to risks of crusting, scabbing, blistering, scarring, darker or lighter pigmentary change, recurrence, incomplete removal and infection.
Detail Level: Detailed
Render Post-Care Instructions In Note?: no
Show Aperture Variable?: Yes
Duration Of Freeze Thaw-Cycle (Seconds): 0

## 2023-02-06 ENCOUNTER — TELEPHONE (OUTPATIENT)
Dept: INTERNAL MEDICINE CLINIC | Facility: CLINIC | Age: 68
End: 2023-02-06

## 2023-02-06 NOTE — TELEPHONE ENCOUNTER
COVID-19 Phone Call    Are you experiencing any of the below symptoms? Fever or Chills yes  If yes, what is your temperature? 101     Cough? yes  Is it dry or productive? At times        Fatigue? yes     Muscle or Body Aches? Yes due to coughing     Headaches? yes     Sore throat? yes  Congestion or runny nose? runny       When did you start experiencing the above symptoms? 2/3/2023      Have you had a Covid Test Done?  Yes  If Yes, what where the results negative  When did you take the test? Last night  Was it a Home Test? yes       Patient has been taking Robitussin/ zicam/ afrin

## 2023-02-06 NOTE — TELEPHONE ENCOUNTER
Spoke to the patient over the phone in response to a message submitted to the on-call physician. Patient states that  since Friday, he has had scratchy throat, rhinorrhea, fever. He says that his problems are getting better, especially in terms of cough and rhinorrhea; his fever has also subsided. Covid-19 test was negative two day. No other symptoms or signs reported. He has taken Robitussin capsules, Zycam, and a nose spray (Afrin) which was particularly helpful. Tylenol this morning helped with temperature elevation. He patient does smoke cigarettes anymore. Mr. Madi Aceves agreed on continuing to take these medications. He was supposed to have blood drawn this week but agreed to reschedule as his symptoms completely resolve. All his questions were answered; patient appreciative of the call.

## 2023-02-14 ENCOUNTER — OFFICE VISIT (OUTPATIENT)
Dept: INTERNAL MEDICINE CLINIC | Facility: CLINIC | Age: 68
End: 2023-02-14
Payer: COMMERCIAL

## 2023-02-14 ENCOUNTER — PATIENT MESSAGE (OUTPATIENT)
Dept: INTERNAL MEDICINE CLINIC | Facility: CLINIC | Age: 68
End: 2023-02-14

## 2023-02-14 VITALS
WEIGHT: 151 LBS | DIASTOLIC BLOOD PRESSURE: 78 MMHG | HEART RATE: 69 BPM | RESPIRATION RATE: 18 BRPM | OXYGEN SATURATION: 97 % | BODY MASS INDEX: 20.45 KG/M2 | HEIGHT: 72 IN | TEMPERATURE: 98.2 F | SYSTOLIC BLOOD PRESSURE: 144 MMHG

## 2023-02-14 DIAGNOSIS — K21.9 GASTROESOPHAGEAL REFLUX DISEASE WITHOUT ESOPHAGITIS: ICD-10-CM

## 2023-02-14 DIAGNOSIS — E53.8 VITAMIN B12 DEFICIENCY: ICD-10-CM

## 2023-02-14 DIAGNOSIS — F13.99 SEDATIVE, HYPNOTIC OR ANXIOLYTIC USE, UNSPECIFIED WITH UNSPECIFIED SEDATIVE, HYPNOTIC OR ANXIOLYTIC-INDUCED DISORDER (HCC): ICD-10-CM

## 2023-02-14 DIAGNOSIS — F32.4 MAJOR DEPRESSIVE DISORDER, SINGLE EPISODE, IN PARTIAL REMISSION (HCC): ICD-10-CM

## 2023-02-14 DIAGNOSIS — E78.5 DYSLIPIDEMIA: ICD-10-CM

## 2023-02-14 DIAGNOSIS — R93.1 ELEVATED CORONARY ARTERY CALCIUM SCORE: ICD-10-CM

## 2023-02-14 DIAGNOSIS — D58.0 HEREDITARY SPHEROCYTOSIS (HCC): ICD-10-CM

## 2023-02-14 DIAGNOSIS — R53.82 CHRONIC FATIGUE: ICD-10-CM

## 2023-02-14 DIAGNOSIS — F41.1 GENERALIZED ANXIETY DISORDER: ICD-10-CM

## 2023-02-14 DIAGNOSIS — N40.1 BENIGN PROSTATIC HYPERPLASIA WITH LOWER URINARY TRACT SYMPTOMS, SYMPTOM DETAILS UNSPECIFIED: ICD-10-CM

## 2023-02-14 DIAGNOSIS — D12.5 ADENOMATOUS POLYP OF SIGMOID COLON: ICD-10-CM

## 2023-02-14 DIAGNOSIS — E03.9 ACQUIRED HYPOTHYROIDISM: ICD-10-CM

## 2023-02-14 DIAGNOSIS — J44.9 COPD, MILD (HCC): Primary | ICD-10-CM

## 2023-02-14 DIAGNOSIS — N40.0 BENIGN PROSTATIC HYPERPLASIA WITHOUT LOWER URINARY TRACT SYMPTOMS: ICD-10-CM

## 2023-02-14 DIAGNOSIS — K90.0 CELIAC DISEASE: ICD-10-CM

## 2023-02-14 DIAGNOSIS — G47.00 INSOMNIA, UNSPECIFIED TYPE: ICD-10-CM

## 2023-02-14 PROBLEM — J43.8 OTHER EMPHYSEMA (HCC): Status: RESOLVED | Noted: 2020-01-09 | Resolved: 2023-02-14

## 2023-02-14 PROCEDURE — 1123F ACP DISCUSS/DSCN MKR DOCD: CPT | Performed by: INTERNAL MEDICINE

## 2023-02-14 PROCEDURE — 99214 OFFICE O/P EST MOD 30 MIN: CPT | Performed by: INTERNAL MEDICINE

## 2023-02-14 RX ORDER — LEVOTHYROXINE SODIUM 0.12 MG/1
125 TABLET ORAL
Qty: 90 TABLET | Refills: 3 | Status: SHIPPED | OUTPATIENT
Start: 2023-02-14

## 2023-02-14 SDOH — ECONOMIC STABILITY: HOUSING INSECURITY
IN THE LAST 12 MONTHS, WAS THERE A TIME WHEN YOU DID NOT HAVE A STEADY PLACE TO SLEEP OR SLEPT IN A SHELTER (INCLUDING NOW)?: NO

## 2023-02-14 SDOH — ECONOMIC STABILITY: FOOD INSECURITY: WITHIN THE PAST 12 MONTHS, THE FOOD YOU BOUGHT JUST DIDN'T LAST AND YOU DIDN'T HAVE MONEY TO GET MORE.: NEVER TRUE

## 2023-02-14 SDOH — ECONOMIC STABILITY: INCOME INSECURITY: HOW HARD IS IT FOR YOU TO PAY FOR THE VERY BASICS LIKE FOOD, HOUSING, MEDICAL CARE, AND HEATING?: NOT HARD AT ALL

## 2023-02-14 SDOH — ECONOMIC STABILITY: FOOD INSECURITY: WITHIN THE PAST 12 MONTHS, YOU WORRIED THAT YOUR FOOD WOULD RUN OUT BEFORE YOU GOT MONEY TO BUY MORE.: NEVER TRUE

## 2023-02-14 NOTE — PROGRESS NOTES
2/14/2023 3:27 PM  Location:[facility] [address] INTERNAL MEDICINE  [place]  Patient #:  [id number]  YOB: 1955          YOUR LAST HEMOGLOBIN A1CS:   No results found for: HBA1C, [id number]    YOUR LAST LIPID PROFILE:   Lab Results   Component Value Date/Time    CHOL 130 02/03/2022 08:18 AM    HDL 36 02/03/2022 08:18 AM    VLDL 16 02/03/2022 08:18 AM         Lab Results   Component Value Date/Time    GFRAA 96 02/03/2022 08:18 AM    BUN 16 02/03/2022 08:18 AM     02/03/2022 08:18 AM    K 4.2 02/03/2022 08:18 AM     02/03/2022 08:18 AM    CO2 25 02/03/2022 08:18 AM           History of Present Illness     Chief Complaint   Patient presents with    6 Month Follow-Up     Pt presents to the office today for a 6 month follow-up      Insomnia     Want to discuss his sleep; the psychiatrist prescribed him mirtazapine and it does help him sleep but he   Want to see what the doctor think about the [employer] Implant     This patient states that his pulmonary status is stable. He denies worsening shortness of breath. He is followed by psychiatry now and seems to be doing well on present medication for depression and sleep.   Mr. Susana Lemus is a 79 y.o. male  who presents for office visit      Allergies   Allergen Reactions    Gluten Meal Other (See Comments)    Wheat Bran Other (See Comments)     Past Medical History:   Diagnosis Date    Abdominal pain, epigastric     Acute upper respiratory infections of unspecified site     Celiac disease     Depression     Disturbance of skin sensation     Dyspepsia and other specified disorders of function of stomach     Generalized anxiety disorder     Hereditary spherocytosis (HCC)     Insomnia, unspecified     Unspecified hypothyroidism      Social History     Socioeconomic History    Marital status:      Spouse name: None    Number of children: None    Years of education: None    Highest education level: None   Tobacco Use    Smoking status: Former     Packs/day: 1.50     Years: 38.00     Pack years: 57.00     Types: Cigarettes     Quit date: 2014     Years since quittin.1    Smokeless tobacco: Never   Substance and Sexual Activity    Alcohol use: Yes     Social Determinants of Health     Financial Resource Strain: Low Risk     Difficulty of Paying Living Expenses: Not hard at all   Food Insecurity: No Food Insecurity    Worried About Running Out of Food in the Last Year: Never true    Sheron of Food in the Last Year: Never true   Transportation Needs: Unknown    Lack of Transportation (Non-Medical): No   Physical Activity: Insufficiently Active    Days of Exercise per Week: 4 days    Minutes of Exercise per Session: 30 min   Housing Stability: Unknown    Unstable Housing in the Last Year: No     Past Surgical History:   Procedure Laterality Date    CHOLECYSTECTOMY  14    HERNIA REPAIR      TONSILLECTOMY       Current Outpatient Medications   Medication Sig Dispense Refill    levothyroxine (SYNTHROID) 125 MCG tablet Take 1 tablet by mouth every morning (before breakfast) 90 tablet 3    mirtazapine (REMERON) 7.5 MG tablet Take 7.5 mg by mouth nightly      Multiple Vitamins-Minerals (MULTIVITAMIN GUMMIES ADULT PO) Take by mouth daily      LORazepam (ATIVAN) 1 MG tablet Take 1 mg by mouth 2 times daily as needed for Anxiety.       Calcium Polycarbophil (FIBER-CAPS PO) Take 2 capsules by mouth 3 times daily      famotidine (PEPCID) 20 MG tablet Take 20 mg by mouth daily      pantoprazole (PROTONIX) 40 MG tablet Take 40 mg by mouth daily      hyoscyamine (LEVSIN/SL) 125 MCG sublingual tablet Place 125 mcg under the tongue every 6 hours as needed for Cramping      vitamin B-12 (CYANOCOBALAMIN) 500 MCG tablet Take 500 mcg by mouth daily      TURMERIC PO Take by mouth daily      FLUoxetine (PROZAC) 20 MG capsule Take 20 mg by mouth daily      rosuvastatin (CRESTOR) 20 MG tablet Take 20 mg by mouth every evening       No current facility-administered medications for this visit. Health Maintenance   Topic Date Due    Lipids  02/03/2023    Low dose CT lung screening  04/11/2023    Depression Monitoring  08/09/2023    Annual Wellness Visit (AWV)  08/11/2023    DTaP/Tdap/Td vaccine (2 - Td or Tdap) 06/06/2027    Colorectal Cancer Screen  10/26/2032    Flu vaccine  Completed    Shingles vaccine  Completed    Pneumococcal 65+ years Vaccine  Completed    COVID-19 Vaccine  Completed    AAA screen  Completed    Hepatitis C screen  Completed    Hepatitis A vaccine  Aged Out    Hib vaccine  Aged Out    Meningococcal (ACWY) vaccine  Aged Out     Family History   Problem Relation Age of Onset    Hypertension Mother     Cancer Mother         Kidney    Hypertension Father     Cancer Father         Prostate    Stroke Father              Review of Systems  Review of Systems    BP (!) 144/78 (Site: Left Upper Arm, Position: Sitting, Cuff Size: Medium Adult)   Pulse 69   Temp 98.2 °F (36.8 °C) (Temporal)   Resp 18   Ht 6' (1.829 m)   Wt 151 lb (68.5 kg)   SpO2 97% Comment: RA  BMI 20.48 kg/m²       Physical Exam    Physical Exam  Constitutional:       General: He is not in acute distress. Appearance: Normal appearance. He is not ill-appearing. HENT:      Head: Normocephalic and atraumatic. Eyes:      Extraocular Movements: Extraocular movements intact. Pupils: Pupils are equal, round, and reactive to light. Cardiovascular:      Rate and Rhythm: Normal rate and regular rhythm. Pulmonary:      Effort: Pulmonary effort is normal.      Breath sounds: Normal breath sounds. Skin:     General: Skin is warm. Neurological:      Mental Status: He is alert. Motor: No weakness. Psychiatric:         Mood and Affect: Mood normal.         Behavior: Behavior normal.         Thought Content:  Thought content normal.         Judgment: Judgment normal.       Assessment & Plan    Encounter Diagnoses   Name Primary?  COPD, mild ([facility] Yes    Acquired hypothyroidism     Adenomatous polyp of sigmoid colon     Sedative, hypnotic or anxiolytic use, unspecified with unspecified sedative, hypnotic or anxiolytic-induced disorder ([facility]     Major depressive disorder, single episode, in partial remission ([facility]     Hereditary spherocytosis ([facility]     Elevated coronary artery calcium score     Celiac disease     Gastroesophageal reflux disease without esophagitis     Dyslipidemia     Generalized anxiety disorder     Insomnia, unspecified type     Chronic fatigue     Benign prostatic hyperplasia without lower urinary tract symptoms     Benign prostatic hyperplasia with lower urinary tract symptoms, symptom details unspecified        Current Outpatient Medications   Medication Sig Dispense Refill    levothyroxine (SYNTHROID) 125 MCG tablet Take 1 tablet by mouth every morning (before breakfast) 90 tablet 3    mirtazapine (REMERON) 7.5 MG tablet Take 7.5 mg by mouth nightly      Multiple Vitamins-Minerals (MULTIVITAMIN GUMMIES ADULT PO) Take by mouth daily      LORazepam (ATIVAN) 1 MG tablet Take 1 mg by mouth 2 times daily as needed for Anxiety.  Calcium Polycarbophil (FIBER-CAPS PO) Take 2 capsules by mouth 3 times daily      famotidine (PEPCID) 20 MG tablet Take 20 mg by mouth daily      pantoprazole (PROTONIX) 40 MG tablet Take 40 mg by mouth daily      hyoscyamine (LEVSIN/SL) 125 MCG sublingual tablet Place 125 mcg under the tongue every 6 hours as needed for Cramping      vitamin B-12 (CYANOCOBALAMIN) 500 MCG tablet Take 500 mcg by mouth daily      TURMERIC PO Take by mouth daily      FLUoxetine (PROZAC) 20 MG capsule Take 20 mg by mouth daily      rosuvastatin (CRESTOR) 20 MG tablet Take 20 mg by mouth every evening       No current facility-administered medications for this visit.        Orders Placed This Encounter   Procedures    Comprehensive Metabolic Panel     Standing Status:   Future     Standing Expiration Date:   2/14/2024    CBC with Auto Differential     Standing Status:   Future     Standing Expiration Date:   2/14/2024    Lipid Panel     Standing Status:   Future     Standing Expiration Date:   2/14/2024    PSA, Diagnostic     Standing Status:   Future     Standing Expiration Date:   2/14/2024    T4, Free     Standing Status:   Future     Standing Expiration Date:   2/14/2024    TSH     Standing Status:   Future     Standing Expiration Date:   2/14/2024       Medications Discontinued During This Encounter   Medication Reason    mirtazapine (REMERON) 15 MG tablet LIST CLEANUP    levothyroxine (SYNTHROID) 125 MCG tablet REORDER       1. COPD, mild (HCC)  Stable followed by pulmonary medicine    2. Acquired hypothyroidism     - T4, Free; Future  - TSH; Future    3. Adenomatous polyp of sigmoid colon  Up-to-date with colonoscopy    4. Sedative, hypnotic or anxiolytic use, unspecified with unspecified sedative, hypnotic or anxiolytic-induced disorder ([address]  Controlled on present meds    5. Major depressive disorder, single episode, in partial remission (HCC)  Stable on present meds    6. Hereditary spherocytosis (HCC)       7. Elevated coronary artery calcium score  Followed by cardiology in the past no evidence of coronary artery symptoms    8. Celiac disease       9. Gastroesophageal reflux disease without esophagitis       10. Dyslipidemia     - Lipid Panel; Future    11. Generalized anxiety disorder       12. Insomnia, unspecified type       13. Chronic fatigue     - Comprehensive Metabolic Panel; Future  - CBC with Auto Differential; Future    14. Benign prostatic hyperplasia without lower urinary tract symptoms     - PSA, Diagnostic; Future              No follow-up provider specified.         Mc Seay MD

## 2023-02-15 NOTE — TELEPHONE ENCOUNTER
----- Message from [address] [employer] [place] sent at 2/14/2023  6:39 PM EST -----  Regarding: Covid record update  HI,    I received 2 additional Covid vaccinations. Moderna  April 18, 2022    [address]  September 21, 2022    Please update your records.     Thanks  [employer]

## 2023-03-06 ENCOUNTER — NURSE ONLY (OUTPATIENT)
Dept: INTERNAL MEDICINE CLINIC | Facility: CLINIC | Age: 68
End: 2023-03-06

## 2023-03-06 DIAGNOSIS — R53.82 CHRONIC FATIGUE: ICD-10-CM

## 2023-03-06 DIAGNOSIS — E03.9 ACQUIRED HYPOTHYROIDISM: ICD-10-CM

## 2023-03-06 DIAGNOSIS — E53.8 VITAMIN B12 DEFICIENCY: ICD-10-CM

## 2023-03-06 DIAGNOSIS — N40.0 BENIGN PROSTATIC HYPERPLASIA WITHOUT LOWER URINARY TRACT SYMPTOMS: ICD-10-CM

## 2023-03-06 DIAGNOSIS — E78.5 DYSLIPIDEMIA: ICD-10-CM

## 2023-03-06 LAB
ALBUMIN SERPL-MCNC: 3.6 G/DL (ref 3.2–4.6)
ALBUMIN/GLOB SERPL: 1 (ref 0.4–1.6)
ALP SERPL-CCNC: 131 U/L (ref 50–136)
ALT SERPL-CCNC: 36 U/L (ref 12–65)
ANION GAP SERPL CALC-SCNC: 3 MMOL/L (ref 2–11)
AST SERPL-CCNC: 26 U/L (ref 15–37)
BASOPHILS # BLD: 0.1 K/UL (ref 0–0.2)
BASOPHILS NFR BLD: 1 % (ref 0–2)
BILIRUB SERPL-MCNC: 1.5 MG/DL (ref 0.2–1.1)
BUN SERPL-MCNC: 14 MG/DL (ref 8–23)
CALCIUM SERPL-MCNC: 9.3 MG/DL (ref 8.3–10.4)
CHLORIDE SERPL-SCNC: 108 MMOL/L (ref 101–110)
CHOLEST SERPL-MCNC: 153 MG/DL
CO2 SERPL-SCNC: 28 MMOL/L (ref 21–32)
CREAT SERPL-MCNC: 0.9 MG/DL (ref 0.8–1.5)
DIFFERENTIAL METHOD BLD: NORMAL
EOSINOPHIL # BLD: 0.2 K/UL (ref 0–0.8)
EOSINOPHIL NFR BLD: 3 % (ref 0.5–7.8)
ERYTHROCYTE [DISTWIDTH] IN BLOOD BY AUTOMATED COUNT: 12.7 % (ref 11.9–14.6)
GLOBULIN SER CALC-MCNC: 3.5 G/DL (ref 2.8–4.5)
GLUCOSE SERPL-MCNC: 91 MG/DL (ref 65–100)
HCT VFR BLD AUTO: 43.6 % (ref 41.1–50.3)
HDLC SERPL-MCNC: 41 MG/DL (ref 40–60)
HDLC SERPL: 3.7
HGB BLD-MCNC: 13.9 G/DL (ref 13.6–17.2)
IMM GRANULOCYTES # BLD AUTO: 0 K/UL (ref 0–0.5)
IMM GRANULOCYTES NFR BLD AUTO: 0 % (ref 0–5)
LDLC SERPL CALC-MCNC: 89.6 MG/DL
LYMPHOCYTES # BLD: 1.6 K/UL (ref 0.5–4.6)
LYMPHOCYTES NFR BLD: 20 % (ref 13–44)
MCH RBC QN AUTO: 32.3 PG (ref 26.1–32.9)
MCHC RBC AUTO-ENTMCNC: 31.9 G/DL (ref 31.4–35)
MCV RBC AUTO: 101.2 FL (ref 82–102)
MONOCYTES # BLD: 0.8 K/UL (ref 0.1–1.3)
MONOCYTES NFR BLD: 9 % (ref 4–12)
NEUTS SEG # BLD: 5.6 K/UL (ref 1.7–8.2)
NEUTS SEG NFR BLD: 67 % (ref 43–78)
NRBC # BLD: 0 K/UL (ref 0–0.2)
PLATELET # BLD AUTO: 188 K/UL (ref 150–450)
PMV BLD AUTO: 10.7 FL (ref 9.4–12.3)
POTASSIUM SERPL-SCNC: 4.4 MMOL/L (ref 3.5–5.1)
PROT SERPL-MCNC: 7.1 G/DL (ref 6.3–8.2)
PSA SERPL-MCNC: 1.2 NG/ML
RBC # BLD AUTO: 4.31 M/UL (ref 4.23–5.6)
SODIUM SERPL-SCNC: 139 MMOL/L (ref 133–143)
T4 FREE SERPL-MCNC: 1.3 NG/DL (ref 0.78–1.46)
TRIGL SERPL-MCNC: 112 MG/DL (ref 35–150)
TSH, 3RD GENERATION: 0.13 UIU/ML (ref 0.36–3.74)
VIT B12 SERPL-MCNC: 1385 PG/ML (ref 193–986)
VLDLC SERPL CALC-MCNC: 22.4 MG/DL (ref 6–23)
WBC # BLD AUTO: 8.4 K/UL (ref 4.3–11.1)

## 2023-03-08 ENCOUNTER — TELEPHONE (OUTPATIENT)
Dept: INTERNAL MEDICINE CLINIC | Facility: CLINIC | Age: 68
End: 2023-03-08

## 2023-03-08 DIAGNOSIS — E03.9 ACQUIRED HYPOTHYROIDISM: Primary | ICD-10-CM

## 2023-03-08 NOTE — TELEPHONE ENCOUNTER
----- Message from Silvia Fishman MD sent at 3/8/2023  3:46 PM EST -----  Please notify patient that their lab results show thyroid needs to be decreased to synthroid 0.112 mg #30 rfx3, repeat TSH T4 in 6 weeks , ordered cms

## 2023-03-09 RX ORDER — LEVOTHYROXINE SODIUM 112 UG/1
112 TABLET ORAL DAILY
Qty: 30 TABLET | Refills: 3 | Status: SHIPPED | OUTPATIENT
Start: 2023-03-09

## 2023-03-09 NOTE — TELEPHONE ENCOUNTER
This has been fully explained to the patient, who indicates understanding. Lab appt scheduled      Rx pending below to be sent to pt's pharmacy.

## 2023-03-13 ENCOUNTER — TELEPHONE (OUTPATIENT)
Dept: INTERNAL MEDICINE CLINIC | Facility: CLINIC | Age: 68
End: 2023-03-13

## 2023-03-13 NOTE — TELEPHONE ENCOUNTER
Pt is wanting to know if he needs another pneumonia shot?     He had prevnar 13 1/6/2017 and pneumovax 23 2/16/2022

## 2023-03-30 RX ORDER — ROSUVASTATIN CALCIUM 20 MG/1
TABLET, COATED ORAL
Qty: 90 TABLET | Refills: 3 | Status: SHIPPED | OUTPATIENT
Start: 2023-03-30

## 2023-04-19 ENCOUNTER — NURSE ONLY (OUTPATIENT)
Dept: INTERNAL MEDICINE CLINIC | Facility: CLINIC | Age: 68
End: 2023-04-19

## 2023-04-19 DIAGNOSIS — N40.0 BENIGN PROSTATIC HYPERPLASIA WITHOUT LOWER URINARY TRACT SYMPTOMS: ICD-10-CM

## 2023-04-19 DIAGNOSIS — R53.82 CHRONIC FATIGUE: ICD-10-CM

## 2023-04-19 DIAGNOSIS — E78.2 MIXED HYPERLIPIDEMIA: ICD-10-CM

## 2023-04-19 DIAGNOSIS — E03.9 ACQUIRED HYPOTHYROIDISM: ICD-10-CM

## 2023-04-19 LAB
ALBUMIN SERPL-MCNC: 3.5 G/DL (ref 3.2–4.6)
ALBUMIN/GLOB SERPL: 1.1 (ref 0.4–1.6)
ALP SERPL-CCNC: 122 U/L (ref 50–136)
ALT SERPL-CCNC: 36 U/L (ref 12–65)
ANION GAP SERPL CALC-SCNC: 3 MMOL/L (ref 2–11)
AST SERPL-CCNC: 28 U/L (ref 15–37)
BASOPHILS # BLD: 0.1 K/UL (ref 0–0.2)
BASOPHILS NFR BLD: 1 % (ref 0–2)
BILIRUB SERPL-MCNC: 1 MG/DL (ref 0.2–1.1)
BUN SERPL-MCNC: 14 MG/DL (ref 8–23)
CALCIUM SERPL-MCNC: 8.9 MG/DL (ref 8.3–10.4)
CHLORIDE SERPL-SCNC: 110 MMOL/L (ref 101–110)
CHOLEST SERPL-MCNC: 145 MG/DL
CO2 SERPL-SCNC: 28 MMOL/L (ref 21–32)
CREAT SERPL-MCNC: 0.9 MG/DL (ref 0.8–1.5)
DIFFERENTIAL METHOD BLD: ABNORMAL
EOSINOPHIL # BLD: 0.2 K/UL (ref 0–0.8)
EOSINOPHIL NFR BLD: 3 % (ref 0.5–7.8)
ERYTHROCYTE [DISTWIDTH] IN BLOOD BY AUTOMATED COUNT: 13.2 % (ref 11.9–14.6)
GLOBULIN SER CALC-MCNC: 3.1 G/DL (ref 2.8–4.5)
GLUCOSE SERPL-MCNC: 84 MG/DL (ref 65–100)
HCT VFR BLD AUTO: 42.2 % (ref 41.1–50.3)
HDLC SERPL-MCNC: 42 MG/DL (ref 40–60)
HDLC SERPL: 3.5
HGB BLD-MCNC: 13.5 G/DL (ref 13.6–17.2)
IMM GRANULOCYTES # BLD AUTO: 0 K/UL (ref 0–0.5)
IMM GRANULOCYTES NFR BLD AUTO: 0 % (ref 0–5)
LDLC SERPL CALC-MCNC: 89.4 MG/DL
LYMPHOCYTES # BLD: 1.6 K/UL (ref 0.5–4.6)
LYMPHOCYTES NFR BLD: 23 % (ref 13–44)
MCH RBC QN AUTO: 32.7 PG (ref 26.1–32.9)
MCHC RBC AUTO-ENTMCNC: 32 G/DL (ref 31.4–35)
MCV RBC AUTO: 102.2 FL (ref 82–102)
MONOCYTES # BLD: 0.8 K/UL (ref 0.1–1.3)
MONOCYTES NFR BLD: 11 % (ref 4–12)
NEUTS SEG # BLD: 4.4 K/UL (ref 1.7–8.2)
NEUTS SEG NFR BLD: 62 % (ref 43–78)
NRBC # BLD: 0 K/UL (ref 0–0.2)
PLATELET # BLD AUTO: 195 K/UL (ref 150–450)
PMV BLD AUTO: 11 FL (ref 9.4–12.3)
POTASSIUM SERPL-SCNC: 4.2 MMOL/L (ref 3.5–5.1)
PROT SERPL-MCNC: 6.6 G/DL (ref 6.3–8.2)
PSA SERPL-MCNC: 0.7 NG/ML
RBC # BLD AUTO: 4.13 M/UL (ref 4.23–5.6)
SODIUM SERPL-SCNC: 141 MMOL/L (ref 133–143)
T4 FREE SERPL-MCNC: 1.2 NG/DL (ref 0.78–1.46)
TRIGL SERPL-MCNC: 68 MG/DL (ref 35–150)
TSH, 3RD GENERATION: 0.26 UIU/ML (ref 0.36–3.74)
VLDLC SERPL CALC-MCNC: 13.6 MG/DL (ref 6–23)
WBC # BLD AUTO: 7 K/UL (ref 4.3–11.1)

## 2023-04-21 ENCOUNTER — TELEPHONE (OUTPATIENT)
Dept: INTERNAL MEDICINE CLINIC | Facility: CLINIC | Age: 68
End: 2023-04-21

## 2023-04-21 DIAGNOSIS — E03.9 ACQUIRED HYPOTHYROIDISM: Primary | ICD-10-CM

## 2023-04-21 RX ORDER — LEVOTHYROXINE SODIUM 0.1 MG/1
100 TABLET ORAL DAILY
Qty: 30 TABLET | Refills: 3 | Status: SHIPPED | OUTPATIENT
Start: 2023-04-21

## 2023-04-21 NOTE — TELEPHONE ENCOUNTER
----- Message from Carlos Rucker MD sent at 4/21/2023  9:07 AM EDT -----  Please notify patient that their lab results show thyroid level is still slightly  high, change synthroid to 0.1 mg daily #30 rfx  TSH T4 in 6 weeks, ordered cms

## 2023-04-26 ENCOUNTER — TELEPHONE (OUTPATIENT)
Dept: INTERNAL MEDICINE CLINIC | Facility: CLINIC | Age: 68
End: 2023-04-26

## 2023-04-27 ENCOUNTER — TELEPHONE (OUTPATIENT)
Dept: INTERNAL MEDICINE CLINIC | Facility: CLINIC | Age: 68
End: 2023-04-27

## 2023-05-30 ENCOUNTER — OFFICE VISIT (OUTPATIENT)
Dept: PULMONOLOGY | Age: 68
End: 2023-05-30
Payer: COMMERCIAL

## 2023-05-30 VITALS
OXYGEN SATURATION: 100 % | HEART RATE: 61 BPM | HEIGHT: 72 IN | BODY MASS INDEX: 20.18 KG/M2 | TEMPERATURE: 98.2 F | WEIGHT: 149 LBS | SYSTOLIC BLOOD PRESSURE: 118 MMHG | DIASTOLIC BLOOD PRESSURE: 82 MMHG

## 2023-05-30 DIAGNOSIS — J43.8 OTHER EMPHYSEMA (HCC): ICD-10-CM

## 2023-05-30 DIAGNOSIS — F17.211 CIGARETTE NICOTINE DEPENDENCE IN REMISSION: ICD-10-CM

## 2023-05-30 DIAGNOSIS — J44.9 COPD, MILD (HCC): Primary | ICD-10-CM

## 2023-05-30 LAB
EXPIRATORY TIME: NORMAL
FEF 25-75% %PRED-PRE: NORMAL
FEF 25-75% PRED: NORMAL
FEF 25-75%-PRE: NORMAL
FEV1 %PRED-PRE: 85 %
FEV1 PRED: NORMAL
FEV1/FVC %PRED-PRE: NORMAL
FEV1/FVC PRED: NORMAL
FEV1/FVC: 63 %
FEV1: 3.08 L
FVC %PRED-PRE: 100 %
FVC PRED: NORMAL
FVC: 4.89 L
PEF %PRED-PRE: NORMAL
PEF PRED: NORMAL
PEF-PRE: NORMAL

## 2023-05-30 PROCEDURE — 99214 OFFICE O/P EST MOD 30 MIN: CPT | Performed by: NURSE PRACTITIONER

## 2023-05-30 PROCEDURE — 94010 BREATHING CAPACITY TEST: CPT | Performed by: NURSE PRACTITIONER

## 2023-05-30 PROCEDURE — G0296 VISIT TO DETERM LDCT ELIG: HCPCS | Performed by: NURSE PRACTITIONER

## 2023-05-30 PROCEDURE — 1123F ACP DISCUSS/DSCN MKR DOCD: CPT | Performed by: NURSE PRACTITIONER

## 2023-05-30 RX ORDER — ALBUTEROL SULFATE 90 UG/1
AEROSOL, METERED RESPIRATORY (INHALATION)
Qty: 1 EACH | Refills: 11 | Status: SHIPPED | OUTPATIENT
Start: 2023-05-30

## 2023-05-30 ASSESSMENT — ENCOUNTER SYMPTOMS
WHEEZING: 0
COUGH: 0
HEMOPTYSIS: 0
SHORTNESS OF BREATH: 1
SPUTUM PRODUCTION: 0

## 2023-05-30 ASSESSMENT — PULMONARY FUNCTION TESTS
FVC: 4.89
FEV1_PERCENT_PREDICTED_PRE: 85
FVC_PERCENT_PREDICTED_PRE: 100
FEV1/FVC: 63
FEV1: 3.08

## 2023-05-30 NOTE — PATIENT INSTRUCTIONS
Albuterol inhaler, 2 puffs 4 times daily if needed for shortness of breath or wheezing, may use prior to exertional activity if desired. Inhaler technique demonstrated. Screening CT will be reordered, I will call him with results. Radiology scheduling department # - [phone] if there are any issues with him hearing from department to schedule study. He is commended in remaining tobacco free. He is up-to-date on COVID, flu and pneumonia vaccines. Recommend seasonal flu vaccine in the fall. Follow-up in 1 year with spirometry and anticipate screening CT prior to visit at that time.

## 2023-05-30 NOTE — PROGRESS NOTES
[address] [facility]. [address] [facility], [address] [facility]  [phone]    Patient Name:  Lakhwinder Hernandez    YOB: 1955    Office Visit 5/30/2023      CHIEF COMPLAINT:      Chief Complaint   Patient presents with    COPD    Follow-up         ASSESSMENT:   (Medical Decision Making)                                                                                                                                          Encounter Diagnoses   Name Primary? COPD, mild ([address] Yes    Other emphysema (HCC)     Cigarette nicotine dependence in remission      Mild COPD, spirometry is consistent with mild obstruction, no significant change. Dyspnea is typically relieved with rest, however he ask about having inhaler to use if needed. Continues to meet criteria for screening CT but this has not been accomplished as ordered. He is agreeable to having study. Discussed current guidelines for screening for lung cancer, annual screening LDCT from age 50-69 or until tobacco free for 15 years. Pack-year history -62 pk yrs  Tobacco status - quit 1/2014  Discussed risk and benefits of screening, currently exhibits no signs and symptoms suggestive of lung cancer. Discussed importance of compliance with annual lung cancer screenings and willingness to undergo diagnosis and treatment if screening scan is positive. In addition, the patient was counseled regarding remaining tobacco free/total smoking cessation. PLAN:     Albuterol inhaler, 2 puffs 4 times daily if needed for shortness of breath or wheezing, may use prior to exertional activity if desired. Inhaler technique demonstrated. Screening CT will be reordered, I will call him with results. Radiology scheduling department #is provided - [phone] if there are any issues with him hearing from department to schedule study. He is commended in remaining tobacco free.       He is up-to-date on

## 2023-05-31 ENCOUNTER — NURSE ONLY (OUTPATIENT)
Dept: INTERNAL MEDICINE CLINIC | Facility: CLINIC | Age: 68
End: 2023-05-31

## 2023-05-31 DIAGNOSIS — E03.9 ACQUIRED HYPOTHYROIDISM: ICD-10-CM

## 2023-05-31 LAB
T4 FREE SERPL-MCNC: 1.1 NG/DL (ref 0.78–1.46)
TSH, 3RD GENERATION: 0.7 UIU/ML (ref 0.36–3.74)

## 2023-06-19 ENCOUNTER — HOSPITAL ENCOUNTER (OUTPATIENT)
Dept: CT IMAGING | Age: 68
Discharge: HOME | End: 2023-06-22
Payer: COMMERCIAL

## 2023-06-19 DIAGNOSIS — F17.211 CIGARETTE NICOTINE DEPENDENCE IN REMISSION: ICD-10-CM

## 2023-06-19 PROCEDURE — 71271 CT THORAX LUNG CANCER SCR C-: CPT

## 2023-06-21 ENCOUNTER — TELEPHONE (OUTPATIENT)
Dept: PULMONOLOGY | Age: 68
End: 2023-06-21

## 2023-06-21 DIAGNOSIS — J44.9 COPD, MILD (HCC): ICD-10-CM

## 2023-06-21 DIAGNOSIS — J43.8 OTHER EMPHYSEMA (HCC): ICD-10-CM

## 2023-06-21 DIAGNOSIS — F17.211 CIGARETTE NICOTINE DEPENDENCE IN REMISSION: Primary | ICD-10-CM

## 2023-06-21 NOTE — TELEPHONE ENCOUNTER
----- Message from Jaylyn Castle RN sent at 6/21/2023 11:10 AM EDT -----  Regarding: FW: Couple of question from the CT scan  Contact: [phone]    ----- Message -----  From: Imelda Mckee \"Junior\"  Sent: 6/20/2023  10:44 AM EDT  To: , #  Subject: Couple of question from the CT scan              Pedro Kendall? (sp)    It looks like I got a good report all things considered. Two questions:  What does \"grossly patent\" mean regarding the trachea and proximal bronchi? What does DJD mean regarding the skeletal/chest wall? Thank you.     Sharon Phillip  [phone]  mobile

## 2023-06-22 NOTE — TELEPHONE ENCOUNTER
Screening CT demonstrates dilated airspaces consistent with emphysema. There are no pulmonary nodules. I called him with results. He is agreeable to follow up chest CT in 1 year and 1 day. Order is placed. Will update appt recall with , appt ~ 6/27/24, w/ PFT's, to follow chest CT. He is reminded of # to call radiology if he does not hear from scheduling at that time frame. Yaritza Cruz,    Please update appt recall for 6/27/2024 or later, w/ PFT's and after LDCT. Thank you.

## 2023-08-09 ENCOUNTER — PATIENT MESSAGE (OUTPATIENT)
Dept: INTERNAL MEDICINE CLINIC | Facility: CLINIC | Age: 68
End: 2023-08-09

## 2023-08-10 DIAGNOSIS — E03.9 ACQUIRED HYPOTHYROIDISM: Primary | ICD-10-CM

## 2023-08-10 NOTE — TELEPHONE ENCOUNTER
From: Janna Dawson  To: Dr. Crane Slight: 8/9/2023 4:25 PM EDT  Subject: Appointment on the 16th    Do I need to give blood on an earlier day to check my hypothyroidism?  Do I need to fast?    Thanks  [employer]

## 2023-08-13 SDOH — HEALTH STABILITY: PHYSICAL HEALTH: ON AVERAGE, HOW MANY MINUTES DO YOU ENGAGE IN EXERCISE AT THIS LEVEL?: 30 MIN

## 2023-08-13 SDOH — HEALTH STABILITY: PHYSICAL HEALTH: ON AVERAGE, HOW MANY DAYS PER WEEK DO YOU ENGAGE IN MODERATE TO STRENUOUS EXERCISE (LIKE A BRISK WALK)?: 5 DAYS

## 2023-08-13 ASSESSMENT — PATIENT HEALTH QUESTIONNAIRE - PHQ9
1. LITTLE INTEREST OR PLEASURE IN DOING THINGS: 0
SUM OF ALL RESPONSES TO PHQ QUESTIONS 1-9: 1
SUM OF ALL RESPONSES TO PHQ9 QUESTIONS 1 & 2: 0
SUM OF ALL RESPONSES TO PHQ QUESTIONS 1-9: 0
1. LITTLE INTEREST OR PLEASURE IN DOING THINGS: 0
5. POOR APPETITE OR OVEREATING: 0
SUM OF ALL RESPONSES TO PHQ QUESTIONS 1-9: 1
SUM OF ALL RESPONSES TO PHQ QUESTIONS 1-9: 0
2. FEELING DOWN, DEPRESSED OR HOPELESS: 0
6. FEELING BAD ABOUT YOURSELF - OR THAT YOU ARE A FAILURE OR HAVE LET YOURSELF OR YOUR FAMILY DOWN: NOT AT ALL
4. FEELING TIRED OR HAVING LITTLE ENERGY: NOT AT ALL
10. IF YOU CHECKED OFF ANY PROBLEMS, HOW DIFFICULT HAVE THESE PROBLEMS MADE IT FOR YOU TO DO YOUR WORK, TAKE CARE OF THINGS AT HOME, OR GET ALONG WITH OTHER PEOPLE: NOT DIFFICULT AT ALL
9. THOUGHTS THAT YOU WOULD BE BETTER OFF DEAD, OR OF HURTING YOURSELF: 0
SUM OF ALL RESPONSES TO PHQ QUESTIONS 1-9: 1
2. FEELING DOWN, DEPRESSED OR HOPELESS: 0
1. LITTLE INTEREST OR PLEASURE IN DOING THINGS: NOT AT ALL
2. FEELING DOWN, DEPRESSED OR HOPELESS: NOT AT ALL
SUM OF ALL RESPONSES TO PHQ9 QUESTIONS 1 & 2: 0
3. TROUBLE FALLING OR STAYING ASLEEP: 1
3. TROUBLE FALLING OR STAYING ASLEEP: SEVERAL DAYS
SUM OF ALL RESPONSES TO PHQ QUESTIONS 1-9: 1
8. MOVING OR SPEAKING SO SLOWLY THAT OTHER PEOPLE COULD HAVE NOTICED. OR THE OPPOSITE - BEING SO FIDGETY OR RESTLESS THAT YOU HAVE BEEN MOVING AROUND A LOT MORE THAN USUAL: NOT AT ALL
7. TROUBLE CONCENTRATING ON THINGS, SUCH AS READING THE NEWSPAPER OR WATCHING TELEVISION: NOT AT ALL
7. TROUBLE CONCENTRATING ON THINGS, SUCH AS READING THE NEWSPAPER OR WATCHING TELEVISION: 0
SUM OF ALL RESPONSES TO PHQ QUESTIONS 1-9: 0
SUM OF ALL RESPONSES TO PHQ QUESTIONS 1-9: 1
10. IF YOU CHECKED OFF ANY PROBLEMS, HOW DIFFICULT HAVE THESE PROBLEMS MADE IT FOR YOU TO DO YOUR WORK, TAKE CARE OF THINGS AT HOME, OR GET ALONG WITH OTHER PEOPLE: 0
6. FEELING BAD ABOUT YOURSELF - OR THAT YOU ARE A FAILURE OR HAVE LET YOURSELF OR YOUR FAMILY DOWN: 0
8. MOVING OR SPEAKING SO SLOWLY THAT OTHER PEOPLE COULD HAVE NOTICED. OR THE OPPOSITE, BEING SO FIGETY OR RESTLESS THAT YOU HAVE BEEN MOVING AROUND A LOT MORE THAN USUAL: 0
5. POOR APPETITE OR OVEREATING: NOT AT ALL
SUM OF ALL RESPONSES TO PHQ QUESTIONS 1-9: 0
9. THOUGHTS THAT YOU WOULD BE BETTER OFF DEAD, OR OF HURTING YOURSELF: NOT AT ALL
4. FEELING TIRED OR HAVING LITTLE ENERGY: 0

## 2023-08-13 ASSESSMENT — LIFESTYLE VARIABLES
HOW MANY STANDARD DRINKS CONTAINING ALCOHOL DO YOU HAVE ON A TYPICAL DAY: 1 OR 2
HOW OFTEN DO YOU HAVE SIX OR MORE DRINKS ON ONE OCCASION: 1
HOW OFTEN DO YOU HAVE A DRINK CONTAINING ALCOHOL: MONTHLY OR LESS
HOW OFTEN DO YOU HAVE A DRINK CONTAINING ALCOHOL: 2
HOW MANY STANDARD DRINKS CONTAINING ALCOHOL DO YOU HAVE ON A TYPICAL DAY: 1

## 2023-08-14 ENCOUNTER — NURSE ONLY (OUTPATIENT)
Dept: INTERNAL MEDICINE CLINIC | Facility: CLINIC | Age: 68
End: 2023-08-14

## 2023-08-14 DIAGNOSIS — E03.9 ACQUIRED HYPOTHYROIDISM: ICD-10-CM

## 2023-08-14 LAB
T4 FREE SERPL-MCNC: 1.1 NG/DL (ref 0.78–1.46)
TSH, 3RD GENERATION: 2.22 UIU/ML (ref 0.36–3.74)

## 2023-08-14 RX ORDER — LEVOTHYROXINE SODIUM 0.1 MG/1
TABLET ORAL
Qty: 90 TABLET | Refills: 3 | Status: SHIPPED | OUTPATIENT
Start: 2023-08-14

## 2023-08-16 ENCOUNTER — OFFICE VISIT (OUTPATIENT)
Dept: INTERNAL MEDICINE CLINIC | Facility: CLINIC | Age: 68
End: 2023-08-16
Payer: COMMERCIAL

## 2023-08-16 VITALS
RESPIRATION RATE: 16 BRPM | OXYGEN SATURATION: 95 % | TEMPERATURE: 97.2 F | WEIGHT: 156.2 LBS | DIASTOLIC BLOOD PRESSURE: 78 MMHG | SYSTOLIC BLOOD PRESSURE: 142 MMHG | HEART RATE: 60 BPM | HEIGHT: 72 IN | BODY MASS INDEX: 21.16 KG/M2

## 2023-08-16 DIAGNOSIS — K90.0 CELIAC DISEASE: ICD-10-CM

## 2023-08-16 DIAGNOSIS — D58.0 HEREDITARY SPHEROCYTOSIS (HCC): ICD-10-CM

## 2023-08-16 DIAGNOSIS — G47.00 INSOMNIA, UNSPECIFIED TYPE: ICD-10-CM

## 2023-08-16 DIAGNOSIS — J44.9 COPD, MILD (HCC): ICD-10-CM

## 2023-08-16 DIAGNOSIS — R53.82 CHRONIC FATIGUE: ICD-10-CM

## 2023-08-16 DIAGNOSIS — D12.5 ADENOMATOUS POLYP OF SIGMOID COLON: ICD-10-CM

## 2023-08-16 DIAGNOSIS — E78.5 DYSLIPIDEMIA: ICD-10-CM

## 2023-08-16 DIAGNOSIS — Z00.00 MEDICARE ANNUAL WELLNESS VISIT, SUBSEQUENT: ICD-10-CM

## 2023-08-16 DIAGNOSIS — R93.1 ELEVATED CORONARY ARTERY CALCIUM SCORE: Primary | ICD-10-CM

## 2023-08-16 DIAGNOSIS — E03.9 ACQUIRED HYPOTHYROIDISM: ICD-10-CM

## 2023-08-16 DIAGNOSIS — K21.9 GASTROESOPHAGEAL REFLUX DISEASE WITHOUT ESOPHAGITIS: ICD-10-CM

## 2023-08-16 DIAGNOSIS — F33.1 MODERATE EPISODE OF RECURRENT MAJOR DEPRESSIVE DISORDER (HCC): ICD-10-CM

## 2023-08-16 PROBLEM — F32.4 MAJOR DEPRESSIVE DISORDER, SINGLE EPISODE, IN PARTIAL REMISSION (HCC): Status: RESOLVED | Noted: 2022-08-10 | Resolved: 2023-08-16

## 2023-08-16 PROBLEM — F13.99 SEDATIVE, HYPNOTIC OR ANXIOLYTIC USE, UNSPECIFIED WITH UNSPECIFIED SEDATIVE, HYPNOTIC OR ANXIOLYTIC-INDUCED DISORDER (HCC): Status: RESOLVED | Noted: 2021-07-23 | Resolved: 2023-08-16

## 2023-08-16 PROCEDURE — 1123F ACP DISCUSS/DSCN MKR DOCD: CPT | Performed by: INTERNAL MEDICINE

## 2023-08-16 PROCEDURE — G0439 PPPS, SUBSEQ VISIT: HCPCS | Performed by: INTERNAL MEDICINE

## 2023-08-16 PROCEDURE — 99213 OFFICE O/P EST LOW 20 MIN: CPT | Performed by: INTERNAL MEDICINE

## 2023-08-16 NOTE — PROGRESS NOTES
Medicare Annual Wellness Visit    Corrie Robles is here for Medicare AWV (subsequent), 6 Month Follow-Up (Pt presents to the office today for a 6 month follow-up ), and Thyroid Problem (Want to discuss his thyroid. )    Assessment & Plan   Elevated coronary artery calcium score  COPD, mild (HCC)  Adenomatous polyp of sigmoid colon  Celiac disease  Gastroesophageal reflux disease without esophagitis  Moderate episode of recurrent major depressive disorder (HCC)  Hereditary spherocytosis ([address])  Dyslipidemia  Insomnia, unspecified type  Medicare annual wellness visit, subsequent    Recommendations for Preventive Services Due: see orders and patient instructions/AVS.  Recommended screening schedule for the next 5-10 years is provided to the patient in written form: see Patient Instructions/AVS.     No follow-ups on file. Subjective   The following acute and/or chronic problems were also addressed today:   Diagnosis Orders   1. Elevated coronary artery calcium score        2. COPD, mild ([address])        3. Adenomatous polyp of sigmoid colon        4. Celiac disease        5. Gastroesophageal reflux disease without esophagitis        6. Moderate episode of recurrent major depressive disorder ([address])        7. Hereditary spherocytosis ([address])        8. Dyslipidemia  Lipid Panel      9. Insomnia, unspecified type        10. Medicare annual wellness visit, subsequent        11. Chronic fatigue  Comprehensive Metabolic Panel    CBC with Auto Differential      12. Acquired hypothyroidism  TSH    T4, Free            Patient's complete Health Risk Assessment and screening values have been reviewed and are found in Flowsheets. The following problems were reviewed today and where indicated follow up appointments were made and/or referrals ordered.     Positive Risk Factor Screenings with Interventions:                       Advanced Directives:  Do you have a Living Will?: (!) No    Intervention:  has NO advanced directive -

## 2023-12-27 ENCOUNTER — TELEPHONE (OUTPATIENT)
Dept: INTERNAL MEDICINE CLINIC | Facility: CLINIC | Age: 68
End: 2023-12-27

## 2023-12-27 DIAGNOSIS — Z12.83 SKIN EXAM, SCREENING FOR CANCER: Primary | ICD-10-CM

## 2023-12-27 NOTE — TELEPHONE ENCOUNTER
----- Message from Ericka Arnold sent at 12/27/2023 10:55 AM EST -----  Subject: Referral Request    Reason for referral request? Dermatologist   Provider patient wants to be referred to(if known): Yanna Tom    Provider Phone Number(if known):     Additional Information for Provider? pt would like a referral to the   Dermatologist / fax # [phone]  ---------------------------------------------------------------------------  --------------  [address]    [phone]; OK to leave message on voicemail  ---------------------------------------------------------------------------  --------------

## 2024-01-22 ASSESSMENT — PATIENT HEALTH QUESTIONNAIRE - PHQ9
2. FEELING DOWN, DEPRESSED OR HOPELESS: SEVERAL DAYS
3. TROUBLE FALLING OR STAYING ASLEEP: SEVERAL DAYS
SUM OF ALL RESPONSES TO PHQ QUESTIONS 1-9: 3
9. THOUGHTS THAT YOU WOULD BE BETTER OFF DEAD, OR OF HURTING YOURSELF: 0
10. IF YOU CHECKED OFF ANY PROBLEMS, HOW DIFFICULT HAVE THESE PROBLEMS MADE IT FOR YOU TO DO YOUR WORK, TAKE CARE OF THINGS AT HOME, OR GET ALONG WITH OTHER PEOPLE: 0
5. POOR APPETITE OR OVEREATING: NOT AT ALL
8. MOVING OR SPEAKING SO SLOWLY THAT OTHER PEOPLE COULD HAVE NOTICED. OR THE OPPOSITE - BEING SO FIDGETY OR RESTLESS THAT YOU HAVE BEEN MOVING AROUND A LOT MORE THAN USUAL: NOT AT ALL
7. TROUBLE CONCENTRATING ON THINGS, SUCH AS READING THE NEWSPAPER OR WATCHING TELEVISION: NOT AT ALL
1. LITTLE INTEREST OR PLEASURE IN DOING THINGS: 0
SUM OF ALL RESPONSES TO PHQ QUESTIONS 1-9: 3
SUM OF ALL RESPONSES TO PHQ QUESTIONS 1-9: 3
9. THOUGHTS THAT YOU WOULD BE BETTER OFF DEAD, OR OF HURTING YOURSELF: NOT AT ALL
SUM OF ALL RESPONSES TO PHQ9 QUESTIONS 1 & 2: 1
4. FEELING TIRED OR HAVING LITTLE ENERGY: 1
SUM OF ALL RESPONSES TO PHQ QUESTIONS 1-9: 3
SUM OF ALL RESPONSES TO PHQ QUESTIONS 1-9: 3
10. IF YOU CHECKED OFF ANY PROBLEMS, HOW DIFFICULT HAVE THESE PROBLEMS MADE IT FOR YOU TO DO YOUR WORK, TAKE CARE OF THINGS AT HOME, OR GET ALONG WITH OTHER PEOPLE: NOT DIFFICULT AT ALL
4. FEELING TIRED OR HAVING LITTLE ENERGY: SEVERAL DAYS
6. FEELING BAD ABOUT YOURSELF - OR THAT YOU ARE A FAILURE OR HAVE LET YOURSELF OR YOUR FAMILY DOWN: 0
3. TROUBLE FALLING OR STAYING ASLEEP: 1
6. FEELING BAD ABOUT YOURSELF - OR THAT YOU ARE A FAILURE OR HAVE LET YOURSELF OR YOUR FAMILY DOWN: NOT AT ALL
5. POOR APPETITE OR OVEREATING: 0
8. MOVING OR SPEAKING SO SLOWLY THAT OTHER PEOPLE COULD HAVE NOTICED. OR THE OPPOSITE, BEING SO FIGETY OR RESTLESS THAT YOU HAVE BEEN MOVING AROUND A LOT MORE THAN USUAL: 0
1. LITTLE INTEREST OR PLEASURE IN DOING THINGS: NOT AT ALL
2. FEELING DOWN, DEPRESSED OR HOPELESS: 1
7. TROUBLE CONCENTRATING ON THINGS, SUCH AS READING THE NEWSPAPER OR WATCHING TELEVISION: 0

## 2024-01-24 ENCOUNTER — TELEPHONE (OUTPATIENT)
Dept: INTERNAL MEDICINE CLINIC | Facility: CLINIC | Age: 69
End: 2024-01-24

## 2024-01-24 ENCOUNTER — OFFICE VISIT (OUTPATIENT)
Dept: INTERNAL MEDICINE CLINIC | Facility: CLINIC | Age: 69
End: 2024-01-24
Payer: COMMERCIAL

## 2024-01-24 VITALS
BODY MASS INDEX: 21.56 KG/M2 | SYSTOLIC BLOOD PRESSURE: 124 MMHG | DIASTOLIC BLOOD PRESSURE: 73 MMHG | HEART RATE: 71 BPM | WEIGHT: 159.2 LBS | HEIGHT: 72 IN | TEMPERATURE: 97.1 F | RESPIRATION RATE: 16 BRPM

## 2024-01-24 DIAGNOSIS — G62.9 PERIPHERAL POLYNEUROPATHY: ICD-10-CM

## 2024-01-24 DIAGNOSIS — N40.1 BENIGN PROSTATIC HYPERPLASIA WITH LOWER URINARY TRACT SYMPTOMS, SYMPTOM DETAILS UNSPECIFIED: ICD-10-CM

## 2024-01-24 DIAGNOSIS — E53.8 VITAMIN B12 DEFICIENCY: ICD-10-CM

## 2024-01-24 DIAGNOSIS — I83.90 VARICOSE VEINS OF LOWER EXTREMITY, UNSPECIFIED LATERALITY, UNSPECIFIED WHETHER COMPLICATED: Primary | ICD-10-CM

## 2024-01-24 DIAGNOSIS — J44.9 COPD, MILD (HCC): ICD-10-CM

## 2024-01-24 DIAGNOSIS — K90.0 CELIAC DISEASE: ICD-10-CM

## 2024-01-24 DIAGNOSIS — E03.9 ACQUIRED HYPOTHYROIDISM: ICD-10-CM

## 2024-01-24 DIAGNOSIS — R53.82 CHRONIC FATIGUE: ICD-10-CM

## 2024-01-24 DIAGNOSIS — F33.1 MODERATE EPISODE OF RECURRENT MAJOR DEPRESSIVE DISORDER (HCC): ICD-10-CM

## 2024-01-24 DIAGNOSIS — R93.1 ELEVATED CORONARY ARTERY CALCIUM SCORE: ICD-10-CM

## 2024-01-24 DIAGNOSIS — D58.0 HEREDITARY SPHEROCYTOSIS (HCC): ICD-10-CM

## 2024-01-24 LAB
ALBUMIN SERPL-MCNC: 3.7 G/DL (ref 3.2–4.6)
ALBUMIN/GLOB SERPL: 1.2 (ref 0.4–1.6)
ALP SERPL-CCNC: 114 U/L (ref 50–136)
ALT SERPL-CCNC: 46 U/L (ref 12–65)
ANION GAP SERPL CALC-SCNC: 2 MMOL/L (ref 2–11)
AST SERPL-CCNC: 26 U/L (ref 15–37)
BASOPHILS # BLD: 0.1 K/UL (ref 0–0.2)
BASOPHILS NFR BLD: 1 % (ref 0–2)
BILIRUB SERPL-MCNC: 0.9 MG/DL (ref 0.2–1.1)
BUN SERPL-MCNC: 14 MG/DL (ref 8–23)
CALCIUM SERPL-MCNC: 9.1 MG/DL (ref 8.3–10.4)
CHLORIDE SERPL-SCNC: 108 MMOL/L (ref 103–113)
CHOLEST SERPL-MCNC: 159 MG/DL
CO2 SERPL-SCNC: 29 MMOL/L (ref 21–32)
CREAT SERPL-MCNC: 1 MG/DL (ref 0.8–1.5)
DIFFERENTIAL METHOD BLD: ABNORMAL
EOSINOPHIL # BLD: 0.2 K/UL (ref 0–0.8)
EOSINOPHIL NFR BLD: 2 % (ref 0.5–7.8)
ERYTHROCYTE [DISTWIDTH] IN BLOOD BY AUTOMATED COUNT: 13.2 % (ref 11.9–14.6)
ERYTHROCYTE [SEDIMENTATION RATE] IN BLOOD: 16 MM/HR (ref 0–20)
EST. AVERAGE GLUCOSE BLD GHB EST-MCNC: 97 MG/DL
GLOBULIN SER CALC-MCNC: 3.2 G/DL (ref 2.8–4.5)
GLUCOSE SERPL-MCNC: 89 MG/DL (ref 65–100)
HBA1C MFR BLD: 5 % (ref 4.8–5.6)
HCT VFR BLD AUTO: 41.7 % (ref 41.1–50.3)
HDLC SERPL-MCNC: 39 MG/DL (ref 40–60)
HDLC SERPL: 4.1
HGB BLD-MCNC: 13.5 G/DL (ref 13.6–17.2)
IMM GRANULOCYTES # BLD AUTO: 0 K/UL (ref 0–0.5)
IMM GRANULOCYTES NFR BLD AUTO: 0 % (ref 0–5)
LDLC SERPL CALC-MCNC: 75.6 MG/DL
LYMPHOCYTES # BLD: 1.6 K/UL (ref 0.5–4.6)
LYMPHOCYTES NFR BLD: 20 % (ref 13–44)
MCH RBC QN AUTO: 32.6 PG (ref 26.1–32.9)
MCHC RBC AUTO-ENTMCNC: 32.4 G/DL (ref 31.4–35)
MCV RBC AUTO: 100.7 FL (ref 82–102)
MONOCYTES # BLD: 0.9 K/UL (ref 0.1–1.3)
MONOCYTES NFR BLD: 12 % (ref 4–12)
NEUTS SEG # BLD: 5 K/UL (ref 1.7–8.2)
NEUTS SEG NFR BLD: 65 % (ref 43–78)
NRBC # BLD: 0 K/UL (ref 0–0.2)
PLATELET # BLD AUTO: 219 K/UL (ref 150–450)
PMV BLD AUTO: 10.9 FL (ref 9.4–12.3)
POTASSIUM SERPL-SCNC: 4.5 MMOL/L (ref 3.5–5.1)
PROT SERPL-MCNC: 6.9 G/DL (ref 6.3–8.2)
PSA SERPL-MCNC: 0.9 NG/ML
RBC # BLD AUTO: 4.14 M/UL (ref 4.23–5.6)
SODIUM SERPL-SCNC: 139 MMOL/L (ref 136–146)
TRIGL SERPL-MCNC: 222 MG/DL (ref 35–150)
VIT B12 SERPL-MCNC: 1305 PG/ML (ref 193–986)
VLDLC SERPL CALC-MCNC: 44.4 MG/DL (ref 6–23)
WBC # BLD AUTO: 7.7 K/UL (ref 4.3–11.1)

## 2024-01-24 PROCEDURE — 99214 OFFICE O/P EST MOD 30 MIN: CPT | Performed by: INTERNAL MEDICINE

## 2024-01-24 PROCEDURE — 1123F ACP DISCUSS/DSCN MKR DOCD: CPT | Performed by: INTERNAL MEDICINE

## 2024-01-24 RX ORDER — PANCRELIPASE LIPASE, PANCRELIPASE PROTEASE, PANCRELIPASE AMYLASE 40000; 126000; 168000 [USP'U]/1; [USP'U]/1; [USP'U]/1
CAPSULE, DELAYED RELEASE ORAL
COMMUNITY
Start: 2023-12-01

## 2024-01-24 RX ORDER — DICYCLOMINE HYDROCHLORIDE 10 MG/1
10 CAPSULE ORAL 3 TIMES DAILY
COMMUNITY
Start: 2023-11-08

## 2024-01-24 NOTE — PROGRESS NOTES
1/24/2024 11:19 AM  Location:[facility] [place] PHYSICIAN SERVICES  [facility] INTERNAL MEDICINE  [place]  Patient #:  [id number]  YOB: 1955          YOUR LAST HEMOGLOBIN A1CS:   No results found for: \"HBA1C\", \"[id number]\"    YOUR LAST LIPID PROFILE:   Lab Results   Component Value Date/Time    CHOL 145 04/19/2023 08:03 AM    HDL 42 04/19/2023 08:03 AM    VLDL 16 02/03/2022 08:18 AM         Lab Results   Component Value Date/Time    GFRAA 96 02/03/2022 08:18 AM    BUN 14 04/19/2023 08:03 AM     04/19/2023 08:03 AM    K 4.2 04/19/2023 08:03 AM     04/19/2023 08:03 AM    CO2 28 04/19/2023 08:03 AM           History of Present Illness     Chief Complaint   Patient presents with    Foot Pain     For the last several months he has been having a lot of pain and burning in both feet.  Sometimes the feet tingles as well. Haven't noticed any numbness.    Referral - General     Would like a referral to a neurologist.   This patient states he is have progressive symptoms of numbness and tingling with discomfort in both feet mostly on the bottoms of the feet.  Denies any leg weakness or other upper extremity symptoms.  He was recently seen by gastroenterology and was started on a new pancreatic enzyme.  He is grieving now that his mother passed away last month at age 88.    Mr. Nieto is a 68 y.o. male  who presents for office visit      Allergies   Allergen Reactions    Gluten Meal Other (See Comments)    Wheat Bran Other (See Comments)     Past Medical History:   Diagnosis Date    Abdominal pain, epigastric     Acute upper respiratory infections of unspecified site     Celiac disease     Depression     Disturbance of skin sensation     Dyspepsia and other specified disorders of function of stomach     Generalized anxiety disorder     Hereditary spherocytosis (HCC)     Insomnia, unspecified     Unspecified hypothyroidism      Social History     Socioeconomic History    Marital status:      Spouse

## 2024-01-24 NOTE — TELEPHONE ENCOUNTER
Please cancel the patient's lab appointment and office appointment for next month.  He was given a new appointment in 6 months cms

## 2024-03-25 RX ORDER — ROSUVASTATIN CALCIUM 20 MG/1
TABLET, COATED ORAL
Qty: 90 TABLET | Refills: 3 | Status: SHIPPED | OUTPATIENT
Start: 2024-03-25

## 2024-06-20 ENCOUNTER — HOSPITAL ENCOUNTER (OUTPATIENT)
Dept: CT IMAGING | Age: 69
Discharge: HOME | End: 2024-06-20
Payer: MEDICARE

## 2024-06-20 DIAGNOSIS — F17.211 CIGARETTE NICOTINE DEPENDENCE IN REMISSION: ICD-10-CM

## 2024-06-20 PROCEDURE — 71271 CT THORAX LUNG CANCER SCR C-: CPT

## 2024-06-27 ENCOUNTER — OFFICE VISIT (OUTPATIENT)
Dept: PULMONOLOGY | Age: 69
End: 2024-06-27
Payer: MEDICARE

## 2024-06-27 VITALS
WEIGHT: 155 LBS | TEMPERATURE: 97.2 F | HEIGHT: 72 IN | SYSTOLIC BLOOD PRESSURE: 128 MMHG | RESPIRATION RATE: 18 BRPM | BODY MASS INDEX: 20.99 KG/M2 | DIASTOLIC BLOOD PRESSURE: 75 MMHG | HEART RATE: 74 BPM | OXYGEN SATURATION: 96 %

## 2024-06-27 DIAGNOSIS — J43.8 OTHER EMPHYSEMA (HCC): ICD-10-CM

## 2024-06-27 DIAGNOSIS — J44.9 COPD, MILD (HCC): Primary | ICD-10-CM

## 2024-06-27 DIAGNOSIS — F17.211 CIGARETTE NICOTINE DEPENDENCE IN REMISSION: ICD-10-CM

## 2024-06-27 LAB
EXPIRATORY TIME: NORMAL
FEF 25-75% %PRED-PRE: NORMAL
FEF 25-75% PRED: NORMAL
FEF 25-75-PRE: NORMAL
FEV1 %PRED-PRE: 80 %
FEV1 PRED: 3.47 L
FEV1/FVC %PRED-PRE: NORMAL
FEV1/FVC PRED: NORMAL
FEV1/FVC: 63 %
FEV1: 2.77 L
FVC %PRED-PRE: 96 %
FVC PRED: 4.6 L
FVC: 4.41 L
PEF %PRED-PRE: NORMAL
PEF PRED: NORMAL
PEF-PRE: NORMAL

## 2024-06-27 PROCEDURE — 1036F TOBACCO NON-USER: CPT | Performed by: NURSE PRACTITIONER

## 2024-06-27 PROCEDURE — 99214 OFFICE O/P EST MOD 30 MIN: CPT | Performed by: NURSE PRACTITIONER

## 2024-06-27 PROCEDURE — 1123F ACP DISCUSS/DSCN MKR DOCD: CPT | Performed by: NURSE PRACTITIONER

## 2024-06-27 PROCEDURE — G8427 DOCREV CUR MEDS BY ELIG CLIN: HCPCS | Performed by: NURSE PRACTITIONER

## 2024-06-27 PROCEDURE — 3017F COLORECTAL CA SCREEN DOC REV: CPT | Performed by: NURSE PRACTITIONER

## 2024-06-27 PROCEDURE — G8420 CALC BMI NORM PARAMETERS: HCPCS | Performed by: NURSE PRACTITIONER

## 2024-06-27 PROCEDURE — 3023F SPIROM DOC REV: CPT | Performed by: NURSE PRACTITIONER

## 2024-06-27 RX ORDER — ALBUTEROL SULFATE 90 UG/1
AEROSOL, METERED RESPIRATORY (INHALATION)
Qty: 1 EACH | Refills: 11 | Status: SHIPPED | OUTPATIENT
Start: 2024-06-27

## 2024-06-27 ASSESSMENT — PULMONARY FUNCTION TESTS
FEV1_PERCENT_PREDICTED_PRE: 80
FVC_PERCENT_PREDICTED_PRE: 96
FEV1: 2.77
FVC: 4.41
FEV1_PREDICTED: 3.47
FEV1/FVC: 63
FVC_PREDICTED: 4.60

## 2024-06-27 ASSESSMENT — ENCOUNTER SYMPTOMS
SPUTUM PRODUCTION: 0
COUGH: 1
SHORTNESS OF BREATH: 1
HEMOPTYSIS: 0
WHEEZING: 0

## 2024-06-27 NOTE — PROGRESS NOTES
He is a very pleasant 68-year-old male seen today for follow-up of COPD with centrilobular emphysema, history of tobacco use, incidentally discovered endobronchial lesion that turned out to be a mucous plug.    LDCT 6/20/2024-upper lobe predominant emphysema, basilar predominant chronic interstitial disease.  Stable scarring in both lung apices.  Moderate upper lobe predominant centrilobular and paraseptal emphysema.  Basilar predominant interstitial disease.  Calcified granuloma in RLL.  No noncalcified nodules.  No adenopathy.      DIAGNOSTICS:     Patient with incidental finding of endobronchial abnormality on calcium scoring scan, resolved on repeat CT and clear on bronch.     Spirometry 1/4/2020- mild obstruction.  Chest CT 3/2020-no persistent endobronchial density in right mainstem bronchus although smaller density now seen the more distal bronchus intermedius of the right lung, favors benign aspirated material.  Chest CT 3/2021-previously seen parabronchial lesion is no longer identified. Advanced emphysema.  LDCT 4/11/2022 - Normal. The lungs are emphysematous. No lung mass or suspicious nodule.  Stable scarring in the right apex.  CPFT's 6/1/2021-mild obstructive defect, negative bronchodilator response.  Mild hyperinflation and gas trapping.  Significantly decreased diffusion capacity.   Spirometry 5/31/2022- mild obstructive defect.  Spirometry 5/30/2023-mild obstructive defect.  No significant change.  LDCT 5/2023 - ordered, not performed.  LDCT 6/19/2023-no pulmonary nodules.  Dilated airspaces consistent with emphysema.  LDCT 6/20/2024-upper lobe predominant emphysema, basilar predominant chronic interstitial disease.  Stable scarring in both lung apices.  Moderate upper lobe predominant centrilobular and paraseptal emphysema.  Basilar predominant interstitial disease.  Calcified granuloma in RLL.  No noncalcified nodules.  No adenopathy.

## 2024-06-27 NOTE — PATIENT INSTRUCTIONS
Albuterol inhaler, 2 puffs 4 times daily if needed for shortness of breath or wheezing, may use prior to exertional activity if desired.     Inhaler technique demonstrated.     Screening CT in 1 year, will scheduled this at his next appt in 6 months.    Follow up in 6 months with spirometry.     He is commended in remaining tobacco free.      Due to findings on chest CT suggestive of vascular calcifications, encouraged to discuss work up for cardiovascular disease with his primary provider.  Consider calcium scoring scan, exercise stress test.  He has had vascular studies of lower extremities with normal findings.    I discussed symptoms for which he should seek medical attention symptoms for which he should seek emergent care.    Flu vaccine in the fall.  He is up-to-date on newest COVID booster.  Up-to-date on Prevnar 20 pneumonia vaccine and RSV vaccine.

## 2024-06-27 NOTE — PROGRESS NOTES
[facility]  [address] , [address]  [place] [address]  [phone]    Patient Name:  Alonso Nieto    YOB: 1955    Office Visit 6/27/2024      CHIEF COMPLAINT:      Chief Complaint   Patient presents with    Follow-up         ASSESSMENT:   (Medical Decision Making)                                                                                                                                          Encounter Diagnoses   Name Primary?    COPD, mild (HCC) Yes    Other emphysema (HCC)     Cigarette nicotine dependence in remission      He is stable symptomatically.  Spirometry demonstrates mild obstructive defect.  There is some decline.  Exertional dyspnea is at baseline.  He has not required albuterol inhaler.    He remains tobacco free.  Recent CT demonstrated upper lobe predominant emphysema, basilar predominant chronic interstitial disease.  Stable scarring in both lung apices.  Moderate upper lobe predominant centrilobular and paraseptal emphysema.  Basilar predominant interstitial disease.  Calcified granuloma in RLL.  No noncalcified nodules.  No adenopathy.    He has questions regarding nonpulmonary findings noted on CT, specifically related to suggestion of vascular calcification.  He denies any cardiac symptoms.  There is no family history of coronary artery disease.  He does have risk factors including dyslipidemia, male sex age over 45, prior tobacco use.  Prior EKG reveals normal sinus rhythm.  No acute abnormalities.  I have recommended that he discuss these findings at upcoming appointment with his primary provider for any additional workup.                      PLAN:     Albuterol inhaler, 2 puffs 4 times daily if needed for shortness of breath or wheezing, may use prior to exertional activity if desired.     Inhaler technique demonstrated.     Screening CT in 1 year, will scheduled this at his next appt in 6 months.    Follow up in 6 months

## 2024-07-11 ENCOUNTER — PROCEDURE VISIT (OUTPATIENT)
Dept: NEUROLOGY | Age: 69
End: 2024-07-11
Payer: MEDICARE

## 2024-07-11 VITALS — BODY MASS INDEX: 21.56 KG/M2 | OXYGEN SATURATION: 97 % | HEART RATE: 64 BPM | WEIGHT: 159 LBS

## 2024-07-11 DIAGNOSIS — R20.2 NUMBNESS AND TINGLING OF BOTH FEET: Primary | ICD-10-CM

## 2024-07-11 DIAGNOSIS — G62.9 NEUROPATHY: ICD-10-CM

## 2024-07-11 DIAGNOSIS — R20.0 NUMBNESS AND TINGLING OF BOTH FEET: Primary | ICD-10-CM

## 2024-07-11 PROCEDURE — 95911 NRV CNDJ TEST 9-10 STUDIES: CPT | Performed by: PSYCHIATRY & NEUROLOGY

## 2024-07-11 PROCEDURE — 95885 MUSC TST DONE W/NERV TST LIM: CPT | Performed by: PSYCHIATRY & NEUROLOGY

## 2024-07-11 NOTE — PROGRESS NOTES
EMG/Nerve Conduction Study Procedure Note  [address]    [address]  [place]  [address]   [phone]      Hx:    Exam:     68 y.o.  M W  male   EMG   lowers...  neuropathy / polyneuropathy. Paresthesias lowers... no atrophy.  Hx father w ? Neurop.  No babinski.   Referring: Dr. Erik Bojorquez      PCP  Technologist: Sharmaine Bocanegra  Height: 6 foot       Summary    needle EMG select lower extremity muscles with CV.                  Controlled environmental factors / EMG lab.  Temperature.   NCV : sensory segments:    Normal bilateral sural SCV.  NCV plantar sensory segments:    Markedly abnormal = = ABSENT/no response of the SNAP of bilateral plantar nerves all segments.  NCV Motor MCV segments:     Normal bilateral peroneal and bilateral tibial MCV.  F-wave studies:         Normal bilateral peroneal and tibial F waves.  H-REFLEX Studies:    Normal bilateral H-reflexes.   NEEDLE EMG:   Tested muscles::    Bilateral TA MG VL muscles tested =  Normal insertional activity and interference pattern/recruitment.  No fasciculations fibrillations positive sharp waves.  Normal MUP.  No BSS AP.  No giant MUP.  No myotonia.  No upper motor neuron sign.          INTERPRETATION:     THESE FINDINGS ARE ELECTROPHYSIOLOGIC EVIDENCE OF PLANTAR NEUROPATHY BILATERALLY.  This could be an early polyneuropathy peripheral neuropathy starting with sensory neuropathy.  Further clinical correlation is warranted.      CONCLUSION:      Polyneuropathy/sensory neuropathy presently.      Procedure Details:       There is a hereditary complement by history.  Possibly hereditary early polyneuropathy.    Discussed with patient.               Please Note::     Data and waveforms * filed under Procedure category [facility].    See Procedure Files for complete data pages.       [ *NOTE:  parts or all of this consultation are produced using artificial voice recognition software.  Some speech errors are inherent in such software and may be

## 2024-07-29 SDOH — ECONOMIC STABILITY: INCOME INSECURITY: HOW HARD IS IT FOR YOU TO PAY FOR THE VERY BASICS LIKE FOOD, HOUSING, MEDICAL CARE, AND HEATING?: NOT HARD AT ALL

## 2024-07-29 SDOH — ECONOMIC STABILITY: FOOD INSECURITY: WITHIN THE PAST 12 MONTHS, THE FOOD YOU BOUGHT JUST DIDN'T LAST AND YOU DIDN'T HAVE MONEY TO GET MORE.: NEVER TRUE

## 2024-07-29 SDOH — ECONOMIC STABILITY: TRANSPORTATION INSECURITY
IN THE PAST 12 MONTHS, HAS LACK OF TRANSPORTATION KEPT YOU FROM MEETINGS, WORK, OR FROM GETTING THINGS NEEDED FOR DAILY LIVING?: NO

## 2024-07-29 SDOH — ECONOMIC STABILITY: FOOD INSECURITY: WITHIN THE PAST 12 MONTHS, YOU WORRIED THAT YOUR FOOD WOULD RUN OUT BEFORE YOU GOT MONEY TO BUY MORE.: NEVER TRUE

## 2024-07-30 RX ORDER — LEVOTHYROXINE SODIUM 0.1 MG/1
TABLET ORAL
Qty: 90 TABLET | Refills: 3 | Status: SHIPPED | OUTPATIENT
Start: 2024-07-30

## 2024-07-31 SDOH — HEALTH STABILITY: PHYSICAL HEALTH: ON AVERAGE, HOW MANY DAYS PER WEEK DO YOU ENGAGE IN MODERATE TO STRENUOUS EXERCISE (LIKE A BRISK WALK)?: 5 DAYS

## 2024-07-31 SDOH — HEALTH STABILITY: PHYSICAL HEALTH: ON AVERAGE, HOW MANY MINUTES DO YOU ENGAGE IN EXERCISE AT THIS LEVEL?: 30 MIN

## 2024-07-31 ASSESSMENT — PATIENT HEALTH QUESTIONNAIRE - PHQ9
SUM OF ALL RESPONSES TO PHQ9 QUESTIONS 1 & 2: 0
4. FEELING TIRED OR HAVING LITTLE ENERGY: NOT AT ALL
6. FEELING BAD ABOUT YOURSELF - OR THAT YOU ARE A FAILURE OR HAVE LET YOURSELF OR YOUR FAMILY DOWN: NOT AT ALL
9. THOUGHTS THAT YOU WOULD BE BETTER OFF DEAD, OR OF HURTING YOURSELF: NOT AT ALL
SUM OF ALL RESPONSES TO PHQ QUESTIONS 1-9: 0
SUM OF ALL RESPONSES TO PHQ QUESTIONS 1-9: 0
1. LITTLE INTEREST OR PLEASURE IN DOING THINGS: NOT AT ALL
2. FEELING DOWN, DEPRESSED OR HOPELESS: NOT AT ALL
10. IF YOU CHECKED OFF ANY PROBLEMS, HOW DIFFICULT HAVE THESE PROBLEMS MADE IT FOR YOU TO DO YOUR WORK, TAKE CARE OF THINGS AT HOME, OR GET ALONG WITH OTHER PEOPLE: NOT DIFFICULT AT ALL
SUM OF ALL RESPONSES TO PHQ QUESTIONS 1-9: 0
SUM OF ALL RESPONSES TO PHQ QUESTIONS 1-9: 0
3. TROUBLE FALLING OR STAYING ASLEEP: NOT AT ALL
5. POOR APPETITE OR OVEREATING: NOT AT ALL
8. MOVING OR SPEAKING SO SLOWLY THAT OTHER PEOPLE COULD HAVE NOTICED. OR THE OPPOSITE, BEING SO FIGETY OR RESTLESS THAT YOU HAVE BEEN MOVING AROUND A LOT MORE THAN USUAL: NOT AT ALL
7. TROUBLE CONCENTRATING ON THINGS, SUCH AS READING THE NEWSPAPER OR WATCHING TELEVISION: NOT AT ALL

## 2024-07-31 ASSESSMENT — LIFESTYLE VARIABLES
HOW OFTEN DO YOU HAVE A DRINK CONTAINING ALCOHOL: 2
HOW OFTEN DO YOU HAVE A DRINK CONTAINING ALCOHOL: MONTHLY OR LESS
HOW MANY STANDARD DRINKS CONTAINING ALCOHOL DO YOU HAVE ON A TYPICAL DAY: 1 OR 2
HOW MANY STANDARD DRINKS CONTAINING ALCOHOL DO YOU HAVE ON A TYPICAL DAY: 1
HOW OFTEN DO YOU HAVE SIX OR MORE DRINKS ON ONE OCCASION: 1

## 2024-08-01 ENCOUNTER — OFFICE VISIT (OUTPATIENT)
Dept: INTERNAL MEDICINE CLINIC | Facility: CLINIC | Age: 69
End: 2024-08-01
Payer: MEDICARE

## 2024-08-01 VITALS
DIASTOLIC BLOOD PRESSURE: 78 MMHG | SYSTOLIC BLOOD PRESSURE: 135 MMHG | BODY MASS INDEX: 21.54 KG/M2 | WEIGHT: 159 LBS | TEMPERATURE: 97.4 F | OXYGEN SATURATION: 96 % | HEART RATE: 67 BPM | HEIGHT: 72 IN

## 2024-08-01 DIAGNOSIS — R53.82 CHRONIC FATIGUE: ICD-10-CM

## 2024-08-01 DIAGNOSIS — N40.0 BENIGN PROSTATIC HYPERPLASIA WITHOUT LOWER URINARY TRACT SYMPTOMS: ICD-10-CM

## 2024-08-01 DIAGNOSIS — F33.1 MODERATE EPISODE OF RECURRENT MAJOR DEPRESSIVE DISORDER (HCC): ICD-10-CM

## 2024-08-01 DIAGNOSIS — R93.1 ELEVATED CORONARY ARTERY CALCIUM SCORE: Primary | ICD-10-CM

## 2024-08-01 DIAGNOSIS — D58.0 HEREDITARY SPHEROCYTOSIS (HCC): ICD-10-CM

## 2024-08-01 DIAGNOSIS — G62.9 PERIPHERAL POLYNEUROPATHY: ICD-10-CM

## 2024-08-01 DIAGNOSIS — E78.5 DYSLIPIDEMIA: ICD-10-CM

## 2024-08-01 DIAGNOSIS — J44.9 COPD, MILD (HCC): ICD-10-CM

## 2024-08-01 DIAGNOSIS — Z00.00 MEDICARE ANNUAL WELLNESS VISIT, SUBSEQUENT: ICD-10-CM

## 2024-08-01 DIAGNOSIS — E03.9 ACQUIRED HYPOTHYROIDISM: ICD-10-CM

## 2024-08-01 DIAGNOSIS — D12.5 ADENOMATOUS POLYP OF SIGMOID COLON: ICD-10-CM

## 2024-08-01 DIAGNOSIS — K21.9 GASTROESOPHAGEAL REFLUX DISEASE WITHOUT ESOPHAGITIS: ICD-10-CM

## 2024-08-01 DIAGNOSIS — E53.8 VITAMIN B12 DEFICIENCY: ICD-10-CM

## 2024-08-01 PROCEDURE — 99213 OFFICE O/P EST LOW 20 MIN: CPT | Performed by: INTERNAL MEDICINE

## 2024-08-01 PROCEDURE — G0439 PPPS, SUBSEQ VISIT: HCPCS | Performed by: INTERNAL MEDICINE

## 2024-08-01 PROCEDURE — 1036F TOBACCO NON-USER: CPT | Performed by: INTERNAL MEDICINE

## 2024-08-01 PROCEDURE — 3017F COLORECTAL CA SCREEN DOC REV: CPT | Performed by: INTERNAL MEDICINE

## 2024-08-01 PROCEDURE — G8427 DOCREV CUR MEDS BY ELIG CLIN: HCPCS | Performed by: INTERNAL MEDICINE

## 2024-08-01 PROCEDURE — G8420 CALC BMI NORM PARAMETERS: HCPCS | Performed by: INTERNAL MEDICINE

## 2024-08-01 PROCEDURE — 1123F ACP DISCUSS/DSCN MKR DOCD: CPT | Performed by: INTERNAL MEDICINE

## 2024-08-01 PROCEDURE — 3023F SPIROM DOC REV: CPT | Performed by: INTERNAL MEDICINE

## 2024-08-01 NOTE — PROGRESS NOTES
Sufficiently Active (7/31/2024)    Exercise Vital Sign     Days of Exercise per Week: 5 days     Minutes of Exercise per Session: 30 min   Housing Stability: Unknown (7/29/2024)    Housing Stability Vital Sign     Unstable Housing in the Last Year: No     Past Surgical History:   Procedure Laterality Date    CHOLECYSTECTOMY  12/24/14    HERNIA REPAIR      TONSILLECTOMY       Current Outpatient Medications   Medication Sig Dispense Refill    levothyroxine (SYNTHROID) 100 MCG tablet TAKE ONE TABLET BY MOUTH ONE TIME DAILY 90 tablet 3    albuterol sulfate HFA (PROVENTIL;VENTOLIN;PROAIR) 108 (90 Base) MCG/ACT inhaler 2 puffs 4 times daily if needed for shortness of breath or wheezing.  Patient will call when refills are needed 1 each 11    rosuvastatin (CRESTOR) 20 MG tablet TAKE ONE TABLET BY MOUTH EVERY EVENING 90 tablet 3    dicyclomine (BENTYL) 10 MG capsule Take 1 capsule by mouth 3 times daily      ZENPEP [id number] units CPEP Take 2 daily with each meal.  Take 1 tablet with any snack other than meals.  Maximum daily dosage = 8 pills      mirtazapine (REMERON) 7.5 MG tablet Take 1 tablet by mouth nightly      Multiple Vitamins-Minerals (MULTIVITAMIN GUMMIES ADULT PO) Take by mouth daily      LORazepam (ATIVAN) 1 MG tablet Take 1 tablet by mouth 2 times daily as needed for Anxiety.      Calcium Polycarbophil (FIBER-CAPS PO) Take 2 capsules by mouth 3 times daily      famotidine (PEPCID) 20 MG tablet Take 2 tablets by mouth at bedtime daily      pantoprazole (PROTONIX) 40 MG tablet Take 1 tablet by mouth daily      vitamin B-12 (CYANOCOBALAMIN) 500 MCG tablet Take 1 tablet by mouth daily      FLUoxetine (PROZAC) 20 MG capsule Take 1 capsule by mouth daily       No current facility-administered medications for this visit.     Health Maintenance   Topic Date Due    Flu vaccine (1) 08/01/2024    Lipids  01/24/2025    Lung Cancer Screening &/or Counseling  06/20/2025    Depression Monitoring  07/31/2025    Colorectal

## 2024-11-03 ENCOUNTER — PATIENT MESSAGE (OUTPATIENT)
Dept: INTERNAL MEDICINE CLINIC | Facility: CLINIC | Age: 69
End: 2024-11-03

## 2024-11-05 ENCOUNTER — OFFICE VISIT (OUTPATIENT)
Dept: INTERNAL MEDICINE CLINIC | Facility: CLINIC | Age: 69
End: 2024-11-05

## 2024-11-05 VITALS
OXYGEN SATURATION: 97 % | TEMPERATURE: 97.9 F | WEIGHT: 156.4 LBS | SYSTOLIC BLOOD PRESSURE: 136 MMHG | BODY MASS INDEX: 21.18 KG/M2 | DIASTOLIC BLOOD PRESSURE: 74 MMHG | HEART RATE: 79 BPM | HEIGHT: 72 IN

## 2024-11-05 DIAGNOSIS — Z86.16 HISTORY OF COVID-19: ICD-10-CM

## 2024-11-05 DIAGNOSIS — R06.02 SOB (SHORTNESS OF BREATH): ICD-10-CM

## 2024-11-05 DIAGNOSIS — R79.89 ELEVATED D-DIMER: ICD-10-CM

## 2024-11-05 DIAGNOSIS — R05.2 SUBACUTE COUGH: ICD-10-CM

## 2024-11-05 DIAGNOSIS — I26.99 BILATERAL PULMONARY EMBOLISM (HCC): ICD-10-CM

## 2024-11-05 DIAGNOSIS — R04.2 HEMOPTYSIS: Primary | ICD-10-CM

## 2024-11-05 DIAGNOSIS — R04.2 HEMOPTYSIS: ICD-10-CM

## 2024-11-05 LAB
ALBUMIN SERPL-MCNC: 3.2 G/DL (ref 3.2–4.6)
ALBUMIN/GLOB SERPL: 0.9 (ref 1–1.9)
ALP SERPL-CCNC: 149 U/L (ref 40–129)
ALT SERPL-CCNC: 37 U/L (ref 8–55)
ANION GAP SERPL CALC-SCNC: 8 MMOL/L (ref 7–16)
AST SERPL-CCNC: 32 U/L (ref 15–37)
BASOPHILS # BLD: 0.1 K/UL (ref 0–0.2)
BASOPHILS NFR BLD: 1 % (ref 0–2)
BILIRUB SERPL-MCNC: 0.9 MG/DL (ref 0–1.2)
BUN SERPL-MCNC: 14 MG/DL (ref 8–23)
CALCIUM SERPL-MCNC: 9.4 MG/DL (ref 8.8–10.2)
CHLORIDE SERPL-SCNC: 105 MMOL/L (ref 98–107)
CO2 SERPL-SCNC: 27 MMOL/L (ref 20–29)
CREAT SERPL-MCNC: 0.91 MG/DL (ref 0.8–1.3)
D DIMER PPP FEU-MCNC: 9.33 UG/ML(FEU)
DIFFERENTIAL METHOD BLD: ABNORMAL
EOSINOPHIL # BLD: 0.2 K/UL (ref 0–0.8)
EOSINOPHIL NFR BLD: 3 % (ref 0.5–7.8)
ERYTHROCYTE [DISTWIDTH] IN BLOOD BY AUTOMATED COUNT: 12.9 % (ref 11.9–14.6)
GLOBULIN SER CALC-MCNC: 3.6 G/DL (ref 2.3–3.5)
GLUCOSE SERPL-MCNC: 84 MG/DL (ref 70–99)
HCT VFR BLD AUTO: 41.1 % (ref 41.1–50.3)
HGB BLD-MCNC: 13.2 G/DL (ref 13.6–17.2)
IMM GRANULOCYTES # BLD AUTO: 0 K/UL (ref 0–0.5)
IMM GRANULOCYTES NFR BLD AUTO: 0 % (ref 0–5)
LYMPHOCYTES # BLD: 1.4 K/UL (ref 0.5–4.6)
LYMPHOCYTES NFR BLD: 19 % (ref 13–44)
MCH RBC QN AUTO: 32 PG (ref 26.1–32.9)
MCHC RBC AUTO-ENTMCNC: 32.1 G/DL (ref 31.4–35)
MCV RBC AUTO: 99.8 FL (ref 82–102)
MONOCYTES # BLD: 0.9 K/UL (ref 0.1–1.3)
MONOCYTES NFR BLD: 13 % (ref 4–12)
NEUTS SEG # BLD: 4.7 K/UL (ref 1.7–8.2)
NEUTS SEG NFR BLD: 64 % (ref 43–78)
NRBC # BLD: 0 K/UL (ref 0–0.2)
PLATELET # BLD AUTO: 211 K/UL (ref 150–450)
PMV BLD AUTO: 10.5 FL (ref 9.4–12.3)
POTASSIUM SERPL-SCNC: 4.3 MMOL/L (ref 3.5–5.1)
PROT SERPL-MCNC: 6.8 G/DL (ref 6.3–8.2)
RBC # BLD AUTO: 4.12 M/UL (ref 4.23–5.6)
SODIUM SERPL-SCNC: 140 MMOL/L (ref 136–145)
WBC # BLD AUTO: 7.4 K/UL (ref 4.3–11.1)

## 2024-11-05 RX ORDER — BENZONATATE 200 MG/1
200 CAPSULE ORAL NIGHTLY
Qty: 14 CAPSULE | Refills: 0 | Status: SHIPPED | OUTPATIENT
Start: 2024-11-05 | End: 2024-11-19

## 2024-11-05 ASSESSMENT — ENCOUNTER SYMPTOMS
SORE THROAT: 0
COUGH: 1
ABDOMINAL DISTENTION: 0
SHORTNESS OF BREATH: 1
CHEST TIGHTNESS: 0
SINUS PAIN: 0
NAUSEA: 0
VOMITING: 0
DIARRHEA: 0
WHEEZING: 0
RHINORRHEA: 1
ABDOMINAL PAIN: 0
CONSTIPATION: 0
SINUS PRESSURE: 0

## 2024-11-05 NOTE — PROGRESS NOTES
[facility]  [address] [address]     Office Visit    Alonso Nieto   1955 11/05/24       Subjective:     Chief Complaint   Patient presents with    Cough     Pt reports cough and congestion x 1 week. He also reports headaches.         History of Present illness:  Mr. Nieto is a 69 y.o. male with PMH of emphysema, celiac, depression, insomnia, neuropathy, sleep apnea, and hypothyroidism who presents for the above. He had a COVID infection in September and waited 3 weeks thereafter and got COVID vaccination. He is having some wheezing, cough, and hemoptysis. Shows picture of hemoptysis he took with his phone.     He continues to have weakness with very slow improvement.     Objective:     Allergies:    Allergies   Allergen Reactions    Gluten Meal Other (See Comments)    Wheat Other (See Comments)        Medical History:    Past Medical History:   Diagnosis Date    Abdominal pain, epigastric     Acute upper respiratory infections of unspecified site     Celiac disease     Depression     Disturbance of skin sensation     Dyspepsia and other specified disorders of function of stomach     Emphysema of lung (HCC) 2019    Generalized anxiety disorder     Hereditary spherocytosis (HCC)     Insomnia, unspecified     Neuropathy 2023    Sleep apnea 2014    Unspecified hypothyroidism         Family History:    Family History   Problem Relation Age of Onset    Hypertension Mother     Cancer Mother         Kidney    Hypertension Father     Cancer Father         Prostate    Stroke Father     Cancer Brother         Testicular        Surgical History:   Past Surgical History:   Procedure Laterality Date    CHOLECYSTECTOMY  12/24/14    HERNIA REPAIR      TONSILLECTOMY          Social History:    Social History       Tobacco History       Smoking Status  Former Smoking Start Date  6/1/1975 Quit Date  8/6/2014 Average Packs/Day  1.5 packs/day for 39.0 years (58.5 ttl

## 2024-11-06 ENCOUNTER — TELEPHONE (OUTPATIENT)
Dept: PULMONOLOGY | Age: 69
End: 2024-11-06

## 2024-11-06 ENCOUNTER — APPOINTMENT (OUTPATIENT)
Dept: ULTRASOUND IMAGING | Age: 69
DRG: 176 | End: 2024-11-06
Payer: MEDICARE

## 2024-11-06 ENCOUNTER — HOSPITAL ENCOUNTER (INPATIENT)
Age: 69
LOS: 1 days | Discharge: HOME | DRG: 176 | End: 2024-11-07
Attending: STUDENT IN AN ORGANIZED HEALTH CARE EDUCATION/TRAINING PROGRAM | Admitting: FAMILY MEDICINE
Payer: MEDICARE

## 2024-11-06 ENCOUNTER — TELEPHONE (OUTPATIENT)
Dept: INTERNAL MEDICINE CLINIC | Facility: CLINIC | Age: 69
End: 2024-11-06

## 2024-11-06 ENCOUNTER — APPOINTMENT (OUTPATIENT)
Dept: CT IMAGING | Age: 69
DRG: 176 | End: 2024-11-06
Payer: MEDICARE

## 2024-11-06 DIAGNOSIS — I26.99 BILATERAL PULMONARY EMBOLISM (HCC): ICD-10-CM

## 2024-11-06 DIAGNOSIS — I26.94 MULTIPLE SUBSEGMENTAL PULMONARY EMBOLI WITHOUT ACUTE COR PULMONALE (HCC): Primary | ICD-10-CM

## 2024-11-06 LAB
ABO + RH BLD: NORMAL
ALBUMIN SERPL-MCNC: 3.5 G/DL (ref 3.2–4.6)
ALBUMIN/GLOB SERPL: 0.8 (ref 1–1.9)
ALP SERPL-CCNC: 156 U/L (ref 40–129)
ALT SERPL-CCNC: 39 U/L (ref 8–55)
ANION GAP SERPL CALC-SCNC: 9 MMOL/L (ref 7–16)
APTT PPP: 28.9 SEC (ref 23.3–37.4)
APTT PPP: 29.4 SEC (ref 23.3–37.4)
AST SERPL-CCNC: 34 U/L (ref 15–37)
BASOPHILS # BLD: 0.1 K/UL (ref 0–0.2)
BASOPHILS NFR BLD: 1 % (ref 0–2)
BILIRUB SERPL-MCNC: 0.9 MG/DL (ref 0–1.2)
BLOOD GROUP ANTIBODIES SERPL: NORMAL
BUN SERPL-MCNC: 12 MG/DL (ref 8–23)
CALCIUM SERPL-MCNC: 9.4 MG/DL (ref 8.8–10.2)
CHLORIDE SERPL-SCNC: 104 MMOL/L (ref 98–107)
CO2 SERPL-SCNC: 26 MMOL/L (ref 20–29)
CREAT SERPL-MCNC: 0.88 MG/DL (ref 0.8–1.3)
DIFFERENTIAL METHOD BLD: ABNORMAL
EKG ATRIAL RATE: 72 BPM
EKG DIAGNOSIS: NORMAL
EKG P AXIS: 81 DEGREES
EKG P-R INTERVAL: 166 MS
EKG Q-T INTERVAL: 386 MS
EKG QRS DURATION: 96 MS
EKG QTC CALCULATION (BAZETT): 422 MS
EKG R AXIS: 66 DEGREES
EKG T AXIS: 67 DEGREES
EKG VENTRICULAR RATE: 72 BPM
EOSINOPHIL # BLD: 0.2 K/UL (ref 0–0.8)
EOSINOPHIL NFR BLD: 2 % (ref 0.5–7.8)
ERYTHROCYTE [DISTWIDTH] IN BLOOD BY AUTOMATED COUNT: 12.9 % (ref 11.9–14.6)
GLOBULIN SER CALC-MCNC: 4.1 G/DL (ref 2.3–3.5)
GLUCOSE SERPL-MCNC: 106 MG/DL (ref 70–99)
HCT VFR BLD AUTO: 41.3 % (ref 41.1–50.3)
HGB BLD-MCNC: 13.4 G/DL (ref 13.6–17.2)
IMM GRANULOCYTES # BLD AUTO: 0 K/UL (ref 0–0.5)
IMM GRANULOCYTES NFR BLD AUTO: 0 % (ref 0–5)
INR PPP: 1
INR PPP: 1
LYMPHOCYTES # BLD: 1.9 K/UL (ref 0.5–4.6)
LYMPHOCYTES NFR BLD: 24 % (ref 13–44)
MCH RBC QN AUTO: 32.1 PG (ref 26.1–32.9)
MCHC RBC AUTO-ENTMCNC: 32.4 G/DL (ref 31.4–35)
MCV RBC AUTO: 98.8 FL (ref 82–102)
MONOCYTES # BLD: 0.8 K/UL (ref 0.1–1.3)
MONOCYTES NFR BLD: 10 % (ref 4–12)
NEUTS SEG # BLD: 4.9 K/UL (ref 1.7–8.2)
NEUTS SEG NFR BLD: 63 % (ref 43–78)
NRBC # BLD: 0 K/UL (ref 0–0.2)
NT PRO BNP: 48 PG/ML (ref 0–125)
PLATELET # BLD AUTO: 207 K/UL (ref 150–450)
PMV BLD AUTO: 10 FL (ref 9.4–12.3)
POTASSIUM SERPL-SCNC: 4.4 MMOL/L (ref 3.5–5.1)
PROT SERPL-MCNC: 7.6 G/DL (ref 6.3–8.2)
PROTHROMBIN TIME: 13.6 SEC (ref 11.3–14.9)
PROTHROMBIN TIME: 14 SEC (ref 11.3–14.9)
RBC # BLD AUTO: 4.18 M/UL (ref 4.23–5.6)
SODIUM SERPL-SCNC: 139 MMOL/L (ref 136–145)
SPECIMEN EXP DATE BLD: NORMAL
TROPONIN T SERPL HS-MCNC: 11 NG/L (ref 0–22)
UFH PPP CHRO-ACNC: <0.1 IU/ML (ref 0.3–0.7)
WBC # BLD AUTO: 7.8 K/UL (ref 4.3–11.1)

## 2024-11-06 PROCEDURE — 6360000004 HC RX CONTRAST MEDICATION: Performed by: STUDENT IN AN ORGANIZED HEALTH CARE EDUCATION/TRAINING PROGRAM

## 2024-11-06 PROCEDURE — 2060000000 HC ICU INTERMEDIATE R&B

## 2024-11-06 PROCEDURE — 83880 ASSAY OF NATRIURETIC PEPTIDE: CPT

## 2024-11-06 PROCEDURE — 86850 RBC ANTIBODY SCREEN: CPT

## 2024-11-06 PROCEDURE — 85520 HEPARIN ASSAY: CPT

## 2024-11-06 PROCEDURE — 6360000002 HC RX W HCPCS: Performed by: FAMILY MEDICINE

## 2024-11-06 PROCEDURE — 85610 PROTHROMBIN TIME: CPT

## 2024-11-06 PROCEDURE — 6370000000 HC RX 637 (ALT 250 FOR IP): Performed by: FAMILY MEDICINE

## 2024-11-06 PROCEDURE — 2580000003 HC RX 258: Performed by: FAMILY MEDICINE

## 2024-11-06 PROCEDURE — 86901 BLOOD TYPING SEROLOGIC RH(D): CPT

## 2024-11-06 PROCEDURE — 71260 CT THORAX DX C+: CPT

## 2024-11-06 PROCEDURE — 93005 ELECTROCARDIOGRAM TRACING: CPT | Performed by: STUDENT IN AN ORGANIZED HEALTH CARE EDUCATION/TRAINING PROGRAM

## 2024-11-06 PROCEDURE — 85730 THROMBOPLASTIN TIME PARTIAL: CPT

## 2024-11-06 PROCEDURE — 86900 BLOOD TYPING SEROLOGIC ABO: CPT

## 2024-11-06 PROCEDURE — 84484 ASSAY OF TROPONIN QUANT: CPT

## 2024-11-06 PROCEDURE — 36415 COLL VENOUS BLD VENIPUNCTURE: CPT

## 2024-11-06 PROCEDURE — 80053 COMPREHEN METABOLIC PANEL: CPT

## 2024-11-06 PROCEDURE — 99285 EMERGENCY DEPT VISIT HI MDM: CPT

## 2024-11-06 PROCEDURE — 93010 ELECTROCARDIOGRAM REPORT: CPT | Performed by: INTERNAL MEDICINE

## 2024-11-06 PROCEDURE — 85025 COMPLETE CBC W/AUTO DIFF WBC: CPT

## 2024-11-06 PROCEDURE — 93970 EXTREMITY STUDY: CPT

## 2024-11-06 RX ORDER — FLUOXETINE 10 MG/1
20 CAPSULE ORAL DAILY
Status: DISCONTINUED | OUTPATIENT
Start: 2024-11-07 | End: 2024-11-07 | Stop reason: HOSPADM

## 2024-11-06 RX ORDER — SODIUM CHLORIDE 0.9 % (FLUSH) 0.9 %
5-40 SYRINGE (ML) INJECTION PRN
Status: DISCONTINUED | OUTPATIENT
Start: 2024-11-06 | End: 2024-11-07 | Stop reason: HOSPADM

## 2024-11-06 RX ORDER — HYDRALAZINE HYDROCHLORIDE 25 MG/1
25 TABLET, FILM COATED ORAL 3 TIMES DAILY
Status: DISCONTINUED | OUTPATIENT
Start: 2024-11-06 | End: 2024-11-07 | Stop reason: HOSPADM

## 2024-11-06 RX ORDER — HEPARIN SODIUM 1000 [USP'U]/ML
40 INJECTION, SOLUTION INTRAVENOUS; SUBCUTANEOUS PRN
Status: DISCONTINUED | OUTPATIENT
Start: 2024-11-06 | End: 2024-11-07

## 2024-11-06 RX ORDER — ROSUVASTATIN CALCIUM 20 MG/1
20 TABLET, COATED ORAL NIGHTLY
Status: DISCONTINUED | OUTPATIENT
Start: 2024-11-06 | End: 2024-11-07 | Stop reason: HOSPADM

## 2024-11-06 RX ORDER — DICYCLOMINE HYDROCHLORIDE 10 MG/1
10 CAPSULE ORAL 3 TIMES DAILY
Status: DISCONTINUED | OUTPATIENT
Start: 2024-11-06 | End: 2024-11-07 | Stop reason: HOSPADM

## 2024-11-06 RX ORDER — ALBUTEROL SULFATE 0.83 MG/ML
2.5 SOLUTION RESPIRATORY (INHALATION) EVERY 4 HOURS PRN
Status: DISCONTINUED | OUTPATIENT
Start: 2024-11-06 | End: 2024-11-07 | Stop reason: HOSPADM

## 2024-11-06 RX ORDER — LORAZEPAM 1 MG/1
1 TABLET ORAL 2 TIMES DAILY PRN
Status: DISCONTINUED | OUTPATIENT
Start: 2024-11-06 | End: 2024-11-07 | Stop reason: HOSPADM

## 2024-11-06 RX ORDER — HEPARIN SODIUM 10000 [USP'U]/100ML
5-30 INJECTION, SOLUTION INTRAVENOUS CONTINUOUS
Status: DISCONTINUED | OUTPATIENT
Start: 2024-11-06 | End: 2024-11-07

## 2024-11-06 RX ORDER — ONDANSETRON 2 MG/ML
4 INJECTION INTRAMUSCULAR; INTRAVENOUS EVERY 6 HOURS PRN
Status: DISCONTINUED | OUTPATIENT
Start: 2024-11-06 | End: 2024-11-07 | Stop reason: HOSPADM

## 2024-11-06 RX ORDER — IOPAMIDOL 755 MG/ML
100 INJECTION, SOLUTION INTRAVASCULAR
Status: COMPLETED | OUTPATIENT
Start: 2024-11-06 | End: 2024-11-06

## 2024-11-06 RX ORDER — ACETAMINOPHEN 650 MG/1
650 SUPPOSITORY RECTAL EVERY 6 HOURS PRN
Status: DISCONTINUED | OUTPATIENT
Start: 2024-11-06 | End: 2024-11-07 | Stop reason: HOSPADM

## 2024-11-06 RX ORDER — ONDANSETRON 4 MG/1
4 TABLET, ORALLY DISINTEGRATING ORAL EVERY 8 HOURS PRN
Status: DISCONTINUED | OUTPATIENT
Start: 2024-11-06 | End: 2024-11-07 | Stop reason: HOSPADM

## 2024-11-06 RX ORDER — ALBUTEROL SULFATE 90 UG/1
2 INHALANT RESPIRATORY (INHALATION) EVERY 4 HOURS PRN
Status: DISCONTINUED | OUTPATIENT
Start: 2024-11-06 | End: 2024-11-06

## 2024-11-06 RX ORDER — POLYETHYLENE GLYCOL 3350 17 G/17G
17 POWDER, FOR SOLUTION ORAL DAILY PRN
Status: DISCONTINUED | OUTPATIENT
Start: 2024-11-06 | End: 2024-11-07 | Stop reason: HOSPADM

## 2024-11-06 RX ORDER — FAMOTIDINE 20 MG/1
20 TABLET, FILM COATED ORAL NIGHTLY
Status: DISCONTINUED | OUTPATIENT
Start: 2024-11-06 | End: 2024-11-07 | Stop reason: HOSPADM

## 2024-11-06 RX ORDER — PANTOPRAZOLE SODIUM 40 MG/1
40 TABLET, DELAYED RELEASE ORAL
Status: DISCONTINUED | OUTPATIENT
Start: 2024-11-07 | End: 2024-11-07 | Stop reason: HOSPADM

## 2024-11-06 RX ORDER — LANOLIN ALCOHOL/MO/W.PET/CERES
500 CREAM (GRAM) TOPICAL DAILY
Status: DISCONTINUED | OUTPATIENT
Start: 2024-11-07 | End: 2024-11-07 | Stop reason: HOSPADM

## 2024-11-06 RX ORDER — MIRTAZAPINE 15 MG/1
7.5 TABLET, FILM COATED ORAL NIGHTLY
Status: DISCONTINUED | OUTPATIENT
Start: 2024-11-06 | End: 2024-11-07 | Stop reason: HOSPADM

## 2024-11-06 RX ORDER — LEVOTHYROXINE SODIUM 100 UG/1
100 TABLET ORAL
Status: DISCONTINUED | OUTPATIENT
Start: 2024-11-07 | End: 2024-11-07 | Stop reason: HOSPADM

## 2024-11-06 RX ORDER — HEPARIN SODIUM 1000 [USP'U]/ML
80 INJECTION, SOLUTION INTRAVENOUS; SUBCUTANEOUS ONCE
Status: COMPLETED | OUTPATIENT
Start: 2024-11-06 | End: 2024-11-06

## 2024-11-06 RX ORDER — SODIUM CHLORIDE 0.9 % (FLUSH) 0.9 %
5-40 SYRINGE (ML) INJECTION EVERY 12 HOURS SCHEDULED
Status: DISCONTINUED | OUTPATIENT
Start: 2024-11-06 | End: 2024-11-07 | Stop reason: HOSPADM

## 2024-11-06 RX ORDER — ACETAMINOPHEN 325 MG/1
650 TABLET ORAL EVERY 6 HOURS PRN
Status: DISCONTINUED | OUTPATIENT
Start: 2024-11-06 | End: 2024-11-07 | Stop reason: HOSPADM

## 2024-11-06 RX ORDER — HYDRALAZINE HYDROCHLORIDE 20 MG/ML
5 INJECTION INTRAMUSCULAR; INTRAVENOUS EVERY 6 HOURS PRN
Status: DISCONTINUED | OUTPATIENT
Start: 2024-11-06 | End: 2024-11-07 | Stop reason: HOSPADM

## 2024-11-06 RX ORDER — SODIUM CHLORIDE 9 MG/ML
INJECTION, SOLUTION INTRAVENOUS PRN
Status: DISCONTINUED | OUTPATIENT
Start: 2024-11-06 | End: 2024-11-07 | Stop reason: HOSPADM

## 2024-11-06 RX ORDER — HEPARIN SODIUM 1000 [USP'U]/ML
80 INJECTION, SOLUTION INTRAVENOUS; SUBCUTANEOUS PRN
Status: DISCONTINUED | OUTPATIENT
Start: 2024-11-06 | End: 2024-11-07

## 2024-11-06 RX ADMIN — HYDRALAZINE HYDROCHLORIDE 25 MG: 25 TABLET ORAL at 21:26

## 2024-11-06 RX ADMIN — SODIUM CHLORIDE, PRESERVATIVE FREE 5 ML: 5 INJECTION INTRAVENOUS at 20:01

## 2024-11-06 RX ADMIN — DICYCLOMINE HYDROCHLORIDE 10 MG: 10 CAPSULE ORAL at 21:26

## 2024-11-06 RX ADMIN — HEPARIN SODIUM 5700 UNITS: 1000 INJECTION INTRAVENOUS; SUBCUTANEOUS at 18:29

## 2024-11-06 RX ADMIN — ROSUVASTATIN CALCIUM 20 MG: 20 TABLET, FILM COATED ORAL at 21:26

## 2024-11-06 RX ADMIN — FAMOTIDINE 20 MG: 20 TABLET, FILM COATED ORAL at 21:26

## 2024-11-06 RX ADMIN — IOPAMIDOL 100 ML: 755 INJECTION, SOLUTION INTRAVENOUS at 17:22

## 2024-11-06 RX ADMIN — MIRTAZAPINE 7.5 MG: 15 TABLET, FILM COATED ORAL at 21:26

## 2024-11-06 RX ADMIN — HEPARIN SODIUM 18 UNITS/KG/HR: 10000 INJECTION, SOLUTION INTRAVENOUS at 18:30

## 2024-11-06 ASSESSMENT — PAIN - FUNCTIONAL ASSESSMENT: PAIN_FUNCTIONAL_ASSESSMENT: NONE - DENIES PAIN

## 2024-11-06 ASSESSMENT — ENCOUNTER SYMPTOMS
SHORTNESS OF BREATH: 0
EYE PAIN: 0
SORE THROAT: 0
EYE REDNESS: 0
NAUSEA: 0
VOMITING: 0
COUGH: 0
ABDOMINAL PAIN: 0

## 2024-11-06 NOTE — ED NOTES
TRANSFER - OUT REPORT:    Verbal report given to Adelina on Alonso Nieto  being transferred to [facility] for routine progression of patient care       Report consisted of patient's Situation, Background, Assessment and   Recommendations(SBAR).     Information from the following report(s) ED SBAR and Adult Overview was reviewed with the receiving nurse.    Lines:   Peripheral IV 11/06/24 Right Antecubital (Active)        Opportunity for questions and clarification was provided.      Patient transported with:  Registered Nurse      Jose Carlos Ambrocio RN  11/06/24 0679

## 2024-11-06 NOTE — CASE COMMUNICATION
Was notified by Radiology that the CT chest that was ordered/done revealed a bilateral upper and lower lobe pulmonary emboli. Heparin drip has been started

## 2024-11-06 NOTE — ED TRIAGE NOTES
Pt ambulatory to ED c/o \"seven blood clots and mass\" in lungs from CT scan today. Pt states he has been coughing up blood. Was called and sent to ED.

## 2024-11-06 NOTE — TELEPHONE ENCOUNTER
Called [facility] to schedule the STAT CTA. Scheduled patient to arrive at 1:15 today and must be fasting  4 hours prior. Called patient and notified him of this. Pt verbalized understanding.     Called [employer] to get authorization for this and it has been approved. Tracking number- [id number]. Authorization number- [id number].     Called [facility] [employer] department ([phone]) and gave them the authorization number for this.

## 2024-11-06 NOTE — TELEPHONE ENCOUNTER
Received call from Mariam at [facility] with stat call report of CTA chest. She states imaging shows + bilateral pulmonary embolisms. She is faxing the reports to us now. Freida notified and states she will call the patient now.

## 2024-11-06 NOTE — PROGRESS NOTES
4 Eyes Skin Assessment     NAME:  Alonso Nieto  YOB: 1955  MEDICAL RECORD NUMBER:  [id number]    The patient is being assessed for  Admission    I agree that at least one RN has performed a thorough Head to Toe Skin Assessment on the patient. ALL assessment sites listed below have been assessed.      Areas assessed by both nurses:    Head, Face, Ears, Shoulders, Back, Chest, Arms, Elbows, Hands, Sacrum. Buttock, Coccyx, Ischium, and Legs. Feet and Heels        Does the Patient have a Wound? No noted wound(s)       Timbo Prevention initiated by RN: Yes  Wound Care Orders initiated by RN: No    Pressure Injury (Stage 3,4, Unstageable, DTI, NWPT, and Complex wounds) if present, place Wound referral order by RN under : No    New Ostomies, if present place, Ostomy referral order under : No     Nurse 1 eSignature: Electronically signed by STEFANY PEARCE RN on 11/6/24 at 6:12 PM EST    **SHARE this note so that the co-signing nurse can place an eSignature**    Nurse 2 eSignature: Electronically signed by eJssa Maloney RN on 11/6/24 at 6:35 PM EST

## 2024-11-06 NOTE — PROGRESS NOTES
TRANSFER - IN REPORT:    Verbal report received from Jose Carlos LEZAMA on Alonso Nieto being received from ED () for routine progression of care.     Report consisted of patient’s Situation, Background, Assessment and Recommendations(SBAR).     Information from the following report(s) SBAR was reviewed. Opportunity for questions and clarification was provided.      Assessment completed upon patient’s arrival to unit and care assumed.     Patient received to room 416. Patient connected to monitor and assessment completed. Plan of care reviewed. Patient oriented to room and call light. Patient aware to use call light to communicate any chest pain or needs.     Admission skin assessment completed with second RN and reveals the following: **

## 2024-11-06 NOTE — ED PROVIDER NOTES
obtained  from the lung apices to the lung bases after the intravenous administration of  100mL Iso-emiliano 370 per pulmonary angiography protocol.  Coronal reconstructions  were performed.    Radiation dose reduction techniques were used for this study. Our CT scanners  use one or all of the following: Automated exposure control, adjustment of the  mA and/or kV according to patient size, iterative reconstruction.    FINDINGS:  STUDY QUALITY: The exam study quality is good.    AIRWAYS: The central airways are patent.    LUNGS: There is extensive centrilobular and paraseptal emphysema with biapical  predominance. Hamptons hump at the right lung base which usually signifies a  small infarct. However repeat follow-up CT would be advised to demonstrate  resolution of the soft tissue density wedge-shaped lesion.    PLEURA: No pleural effusion or pneumothorax.     HEART: The heart is not enlarged. No calcified coronary atherosclerosis.  No  pericardial effusion.     THORACIC AORTA: The aorta is normal in caliber.     PULMONARY ARTERY: Bilateral upper and bilateral lower lobe pulmonary emboli are  noted. No large saddle component is identified. The main pulmonary artery is  normal in caliber.    MEDIASTINUM/KAMRON: No mediastinal mass or lymphadenopathy.    CHEST WALL: No mass or axillary lymphadenopathy.     UPPER ABDOMEN: Postcholecystectomy.    BONES: No suspicious osseous lesion.         Impression    1.  Bilateral upper and bilateral lower lobe pulmonary emboli. No right  ventricular enlargement to suggest strain. These are partially recanalized.  2.  Severe centrilobular and paraseptal emphysema.  3.  Wedge-shaped pleural-based defect may represent a Hamptons hump however  resolution and a follow-up CT would be recommended to exclude neoplastic  etiologies. Findings were notified toJeanette Pope NP immediately after exam  was performed through [employer] and over verbal phone call.    Electronically signed by Velasquez

## 2024-11-06 NOTE — H&P
segmental and subsegmental branches, left lower lobe segmental and subsegmental branches, right upper lobe subsegmental, right lower lobe segmental and subsegmental branches. LUNGS AND AIRWAYS: Severe emphysematous changes with a right lower lobe mass measuring 2.2 x 1.4 cm. THYROID LOBES: Homogeneous. THORACIC INLET AND AORTA AND GREAT VESSELS OF THE AORTA: Unremarkable. MEDIASTINUM: Unremarkable.   ESOPHAGUS: Unremarkable. CARDIAC: Diffusely enlarged. There is no pericardial effusion. UPPER ABDOMINAL IMAGES: No acute inflammatory process. There are changes of cholecystectomy. OTHER: The visualized axillae and breasts appear normal. The spine and ribs appear unremarkable. IMPRESSION: 1. LARGE BILATERAL PULMONARY EMBOLI. 2. SPICULATED MASS IN THE RIGHT LOWER LOBE SUSPICIOUS FOR PRIMARY LUNG CARCINOMA. RESULTS GIVEN TO REFERRING PROVIDER BY [facility]. PATIENT IS INSTRUCTED TO GO TO THE EMERGENCY ROOM FOR FURTHER EVALUATION AT THE TIME OF DICTATION. PROCEDURE RADIATION DOSE: DLP: 221.29, mGy.cm/Effective Dose: 3.1, mSv Dictated by: Karen Fernandez on 11/06/2024  2:22 PM Transcribed by: JERRY MELGAR on 11/06/2024  2:22 PM Electronically verified by: Karen Fernandez on 11/06/2024  2:50 PM        Signed:  ERNST KRAFT MD    Part of this note may have been written by using a voice dictation software.  The note has been proof read but may still contain some grammatical/other typographical errors.  
independent

## 2024-11-06 NOTE — TELEPHONE ENCOUNTER
Patient had CT today and there was multiple blood clots in his lungs . Ws told that the patient needs an appt asap . He will be going on Eliis

## 2024-11-07 ENCOUNTER — APPOINTMENT (OUTPATIENT)
Dept: NON INVASIVE DIAGNOSTICS | Age: 69
DRG: 176 | End: 2024-11-07
Attending: FAMILY MEDICINE
Payer: MEDICARE

## 2024-11-07 ENCOUNTER — PATIENT MESSAGE (OUTPATIENT)
Dept: PULMONOLOGY | Age: 69
End: 2024-11-07

## 2024-11-07 ENCOUNTER — TELEPHONE (OUTPATIENT)
Dept: INTERNAL MEDICINE CLINIC | Facility: CLINIC | Age: 69
End: 2024-11-07

## 2024-11-07 VITALS
DIASTOLIC BLOOD PRESSURE: 75 MMHG | WEIGHT: 151.8 LBS | HEART RATE: 60 BPM | OXYGEN SATURATION: 95 % | BODY MASS INDEX: 20.56 KG/M2 | TEMPERATURE: 98.4 F | RESPIRATION RATE: 16 BRPM | SYSTOLIC BLOOD PRESSURE: 130 MMHG | HEIGHT: 72 IN

## 2024-11-07 DIAGNOSIS — R79.89 ELEVATED D-DIMER: ICD-10-CM

## 2024-11-07 DIAGNOSIS — R06.02 SOB (SHORTNESS OF BREATH): ICD-10-CM

## 2024-11-07 DIAGNOSIS — R04.2 HEMOPTYSIS: ICD-10-CM

## 2024-11-07 LAB
ANION GAP SERPL CALC-SCNC: 13 MMOL/L (ref 7–16)
BASOPHILS # BLD: 0.1 K/UL (ref 0–0.2)
BASOPHILS NFR BLD: 1 % (ref 0–2)
BUN SERPL-MCNC: 14 MG/DL (ref 8–23)
CALCIUM SERPL-MCNC: 9 MG/DL (ref 8.8–10.2)
CHLORIDE SERPL-SCNC: 103 MMOL/L (ref 98–107)
CO2 SERPL-SCNC: 21 MMOL/L (ref 20–29)
CREAT SERPL-MCNC: 0.89 MG/DL (ref 0.8–1.3)
CRP SERPL-MCNC: 9 MG/L (ref 0–10)
DIFFERENTIAL METHOD BLD: ABNORMAL
ECHO AO ROOT DIAM: 2.9 CM
ECHO AO ROOT INDEX: 1.53 CM/M2
ECHO AV AREA PEAK VELOCITY: 2.6 CM2
ECHO AV AREA VTI: 2.2 CM2
ECHO AV AREA/BSA PEAK VELOCITY: 1.4 CM2/M2
ECHO AV AREA/BSA VTI: 1.2 CM2/M2
ECHO AV MEAN GRADIENT: 5 MMHG
ECHO AV MEAN VELOCITY: 1.1 M/S
ECHO AV PEAK GRADIENT: 10 MMHG
ECHO AV PEAK VELOCITY: 1.6 M/S
ECHO AV VELOCITY RATIO: 0.75
ECHO AV VTI: 31.9 CM
ECHO BSA: 1.89 M2
ECHO EST RA PRESSURE: 3 MMHG
ECHO IVC PROX: 1.1 CM
ECHO LA AREA 2C: 18 CM2
ECHO LA AREA 4C: 15.8 CM2
ECHO LA DIAMETER INDEX: 2.43 CM/M2
ECHO LA DIAMETER: 4.6 CM
ECHO LA MAJOR AXIS: 5.7 CM
ECHO LA MINOR AXIS: 5.3 CM
ECHO LA TO AORTIC ROOT RATIO: 1.59
ECHO LA VOL BP: 42 ML (ref 18–58)
ECHO LA VOL MOD A2C: 46 ML (ref 18–58)
ECHO LA VOL MOD A4C: 35 ML (ref 18–58)
ECHO LA VOL/BSA BIPLANE: 22 ML/M2 (ref 16–34)
ECHO LA VOLUME INDEX MOD A2C: 24 ML/M2 (ref 16–34)
ECHO LA VOLUME INDEX MOD A4C: 19 ML/M2 (ref 16–34)
ECHO LV E' LATERAL VELOCITY: 12.1 CM/S
ECHO LV E' SEPTAL VELOCITY: 6.1 CM/S
ECHO LV EDV A2C: 97 ML
ECHO LV EDV A4C: 96 ML
ECHO LV EDV INDEX A4C: 51 ML/M2
ECHO LV EDV NDEX A2C: 51 ML/M2
ECHO LV EJECTION FRACTION A2C: 60 %
ECHO LV EJECTION FRACTION A4C: 60 %
ECHO LV EJECTION FRACTION BIPLANE: 60 % (ref 55–100)
ECHO LV ESV A2C: 39 ML
ECHO LV ESV A4C: 38 ML
ECHO LV ESV INDEX A2C: 21 ML/M2
ECHO LV ESV INDEX A4C: 20 ML/M2
ECHO LV FRACTIONAL SHORTENING: 52 % (ref 28–44)
ECHO LV INTERNAL DIMENSION DIASTOLE INDEX: 2.86 CM/M2
ECHO LV INTERNAL DIMENSION DIASTOLIC: 5.4 CM (ref 4.2–5.9)
ECHO LV INTERNAL DIMENSION SYSTOLIC INDEX: 1.38 CM/M2
ECHO LV INTERNAL DIMENSION SYSTOLIC: 2.6 CM
ECHO LV IVSD: 0.7 CM (ref 0.6–1)
ECHO LV MASS 2D: 131.2 G (ref 88–224)
ECHO LV MASS INDEX 2D: 69.4 G/M2 (ref 49–115)
ECHO LV POSTERIOR WALL DIASTOLIC: 0.7 CM (ref 0.6–1)
ECHO LV RELATIVE WALL THICKNESS RATIO: 0.26
ECHO LVOT AREA: 3.5 CM2
ECHO LVOT AV VTI INDEX: 0.64
ECHO LVOT DIAM: 2.1 CM
ECHO LVOT MEAN GRADIENT: 2 MMHG
ECHO LVOT PEAK GRADIENT: 5 MMHG
ECHO LVOT PEAK VELOCITY: 1.2 M/S
ECHO LVOT STROKE VOLUME INDEX: 37.5 ML/M2
ECHO LVOT SV: 71 ML
ECHO LVOT VTI: 20.5 CM
ECHO MV A VELOCITY: 0.85 M/S
ECHO MV E DECELERATION TIME (DT): 277 MS
ECHO MV E VELOCITY: 0.71 M/S
ECHO MV E/A RATIO: 0.84
ECHO MV E/E' LATERAL: 5.87
ECHO MV E/E' RATIO (AVERAGED): 8.75
ECHO MV E/E' SEPTAL: 11.64
ECHO PV ACCELERATION TIME (AT): 114 MS
ECHO PV MAX VELOCITY: 1.1 M/S
ECHO PV PEAK GRADIENT: 5 MMHG
ECHO RIGHT VENTRICULAR SYSTOLIC PRESSURE (RVSP): 28 MMHG
ECHO RV BASAL DIMENSION: 3.8 CM
ECHO RV FREE WALL PEAK S': 13.3 CM/S
ECHO RV INTERNAL DIMENSION: 1.8 CM
ECHO RV TAPSE: 2.3 CM (ref 1.7–?)
ECHO TV REGURGITANT MAX VELOCITY: 2.48 M/S
ECHO TV REGURGITANT PEAK GRADIENT: 25 MMHG
EOSINOPHIL # BLD: 0.3 K/UL (ref 0–0.8)
EOSINOPHIL NFR BLD: 3 % (ref 0.5–7.8)
ERYTHROCYTE [DISTWIDTH] IN BLOOD BY AUTOMATED COUNT: 12.9 % (ref 11.9–14.6)
GLUCOSE SERPL-MCNC: 92 MG/DL (ref 70–99)
HCT VFR BLD AUTO: 41.7 % (ref 41.1–50.3)
HGB BLD-MCNC: 13.5 G/DL (ref 13.6–17.2)
IMM GRANULOCYTES # BLD AUTO: 0 K/UL (ref 0–0.5)
IMM GRANULOCYTES NFR BLD AUTO: 0 % (ref 0–5)
LYMPHOCYTES # BLD: 2 K/UL (ref 0.5–4.6)
LYMPHOCYTES NFR BLD: 25 % (ref 13–44)
MCH RBC QN AUTO: 32.2 PG (ref 26.1–32.9)
MCHC RBC AUTO-ENTMCNC: 32.4 G/DL (ref 31.4–35)
MCV RBC AUTO: 99.5 FL (ref 82–102)
MONOCYTES # BLD: 0.9 K/UL (ref 0.1–1.3)
MONOCYTES NFR BLD: 11 % (ref 4–12)
NEUTS SEG # BLD: 5 K/UL (ref 1.7–8.2)
NEUTS SEG NFR BLD: 60 % (ref 43–78)
NRBC # BLD: 0 K/UL (ref 0–0.2)
PLATELET # BLD AUTO: 208 K/UL (ref 150–450)
PMV BLD AUTO: 10.4 FL (ref 9.4–12.3)
POTASSIUM SERPL-SCNC: 4 MMOL/L (ref 3.5–5.1)
RBC # BLD AUTO: 4.19 M/UL (ref 4.23–5.6)
SODIUM SERPL-SCNC: 136 MMOL/L (ref 136–145)
UFH PPP CHRO-ACNC: >1.18 IU/ML (ref 0.3–0.7)
WBC # BLD AUTO: 8.2 K/UL (ref 4.3–11.1)

## 2024-11-07 PROCEDURE — 6360000002 HC RX W HCPCS: Performed by: STUDENT IN AN ORGANIZED HEALTH CARE EDUCATION/TRAINING PROGRAM

## 2024-11-07 PROCEDURE — 93306 TTE W/DOPPLER COMPLETE: CPT

## 2024-11-07 PROCEDURE — 6370000000 HC RX 637 (ALT 250 FOR IP): Performed by: FAMILY MEDICINE

## 2024-11-07 PROCEDURE — 36415 COLL VENOUS BLD VENIPUNCTURE: CPT

## 2024-11-07 PROCEDURE — 6370000000 HC RX 637 (ALT 250 FOR IP): Performed by: STUDENT IN AN ORGANIZED HEALTH CARE EDUCATION/TRAINING PROGRAM

## 2024-11-07 PROCEDURE — 94640 AIRWAY INHALATION TREATMENT: CPT

## 2024-11-07 PROCEDURE — 99223 1ST HOSP IP/OBS HIGH 75: CPT | Performed by: INTERNAL MEDICINE

## 2024-11-07 PROCEDURE — 85520 HEPARIN ASSAY: CPT

## 2024-11-07 PROCEDURE — 80048 BASIC METABOLIC PNL TOTAL CA: CPT

## 2024-11-07 PROCEDURE — 2580000003 HC RX 258: Performed by: FAMILY MEDICINE

## 2024-11-07 PROCEDURE — 85025 COMPLETE CBC W/AUTO DIFF WBC: CPT

## 2024-11-07 PROCEDURE — 94761 N-INVAS EAR/PLS OXIMETRY MLT: CPT

## 2024-11-07 PROCEDURE — 6370000000 HC RX 637 (ALT 250 FOR IP): Performed by: INTERNAL MEDICINE

## 2024-11-07 RX ORDER — ALBUTEROL SULFATE 0.83 MG/ML
2.5 SOLUTION RESPIRATORY (INHALATION)
Status: DISCONTINUED | OUTPATIENT
Start: 2024-11-07 | End: 2024-11-07 | Stop reason: HOSPADM

## 2024-11-07 RX ADMIN — CYANOCOBALAMIN TAB 1000 MCG 500 MCG: 1000 TAB at 08:09

## 2024-11-07 RX ADMIN — APIXABAN 10 MG: 5 TABLET, FILM COATED ORAL at 11:26

## 2024-11-07 RX ADMIN — IPRATROPIUM BROMIDE 0.5 MG: 0.5 SOLUTION RESPIRATORY (INHALATION) at 12:11

## 2024-11-07 RX ADMIN — PANTOPRAZOLE SODIUM 40 MG: 40 TABLET, DELAYED RELEASE ORAL at 06:25

## 2024-11-07 RX ADMIN — HYDRALAZINE HYDROCHLORIDE 25 MG: 25 TABLET ORAL at 08:09

## 2024-11-07 RX ADMIN — FLUOXETINE HYDROCHLORIDE 20 MG: 10 CAPSULE ORAL at 08:09

## 2024-11-07 RX ADMIN — PANCRELIPASE LIPASE, PANCRELIPASE PROTEASE, PANCRELIPASE AMYLASE 80000 UNITS: 20000; 63000; 84000 CAPSULE, DELAYED RELEASE ORAL at 08:09

## 2024-11-07 RX ADMIN — DICYCLOMINE HYDROCHLORIDE 10 MG: 10 CAPSULE ORAL at 08:09

## 2024-11-07 RX ADMIN — SODIUM CHLORIDE, PRESERVATIVE FREE 10 ML: 5 INJECTION INTRAVENOUS at 08:17

## 2024-11-07 RX ADMIN — LEVOTHYROXINE SODIUM 100 MCG: 0.1 TABLET ORAL at 06:25

## 2024-11-07 NOTE — PROGRESS NOTES
Spoke with Elmer in lab regarding Xa result that is still pending from 0023 collection. Elmer states he informed Coag tech and should result soon.

## 2024-11-07 NOTE — CONSENT
Informed Consent for Blood Component Transfusion Note    I have discussed with the patient the rationale for blood component transfusion; its benefits in treating or preventing fatigue, organ damage, or death; and its risk which includes mild transfusion reactions, rare risk of blood borne infection, or more serious but rare reactions. I have discussed the alternatives to transfusion, including the risk and consequences of not receiving transfusion. The patient had an opportunity to ask questions and had agreed to proceed with transfusion of blood components.    Electronically signed by ERNST KRAFT MD on 11/6/24 at 7:28 PM EST

## 2024-11-07 NOTE — PROGRESS NOTES
Discharge instructions reviewed with Alonso Nieto and his spouse. Prescriptions given directly to patient from meds to bed Opportunity for questions provided. Pateint voiced understanding of all discharge instructions.

## 2024-11-07 NOTE — TELEPHONE ENCOUNTER
Called and checked on patient. He does have DVT right popliteal vein and BL Pe's on Eliquis. He enquired about hospital follow up. We will call and schedule follow up with him.

## 2024-11-07 NOTE — PLAN OF CARE
Problem: Discharge Planning  Goal: Discharge to home or other facility with appropriate resources  11/7/2024 1152 by Prema Zabala RN  Outcome: Adequate for Discharge  Flowsheets (Taken 11/7/2024 0809 by Adelina Bonner RN)  Discharge to home or other facility with appropriate resources:   Identify barriers to discharge with patient and caregiver   Arrange for needed discharge resources and transportation as appropriate   Identify discharge learning needs (meds, wound care, etc)  11/7/2024 0126 by Porsha Olvera RN  Outcome: Progressing  Flowsheets (Taken 11/6/2024 1808 by Adelina Bonner RN)  Discharge to home or other facility with appropriate resources:   Identify barriers to discharge with patient and caregiver   Arrange for needed discharge resources and transportation as appropriate   Identify discharge learning needs (meds, wound care, etc)     Problem: Safety - Adult  Goal: Free from fall injury  11/7/2024 1152 by Prema Zabala RN  Outcome: Adequate for Discharge  Flowsheets (Taken 11/7/2024 0809 by Adelina Bonner RN)  Free From Fall Injury:   Instruct family/caregiver on patient safety   Based on caregiver fall risk screen, instruct family/caregiver to ask for assistance with transferring infant if caregiver noted to have fall risk factors  11/7/2024 0126 by Porsha Olvera RN  Outcome: Progressing     Problem: Respiratory - Adult  Goal: Achieves optimal ventilation and oxygenation  11/7/2024 1152 by Prema Zabala RN  Outcome: Adequate for Discharge  Flowsheets (Taken 11/7/2024 0809 by Adelina Bonner RN)  Achieves optimal ventilation and oxygenation:   Assess for changes in respiratory status   Assess for changes in mentation and behavior   Position to facilitate oxygenation and minimize respiratory effort   Oxygen supplementation based on oxygen saturation or arterial blood gases  11/7/2024 0126 by Porsha Olvera RN  Reactivated     Problem: Cardiovascular - Adult  Goal: Maintains

## 2024-11-07 NOTE — PROGRESS NOTES
Outpatient Pharmacy Progress Note for Meds-to-Beds    Total number of Prescriptions Filled: 1    List of Medications (name,strength):    Eliquis 5mg    Delivered to:  Patient's room    Co-pay:  $0.00    Payment Type:  N/A    Additional Documentation:  Medication(s) were delivered to the patient's room prior to discharge      Thank you for letting us serve your patients.  [facility] [id number]- [place] [address]  Phone: [phone]  Fax: [phone]

## 2024-11-07 NOTE — PLAN OF CARE
Problem: Discharge Planning  Goal: Discharge to home or other facility with appropriate resources  Outcome: Progressing  Flowsheets (Taken 11/6/2024 1808 by Adelina Bonner, RN)  Discharge to home or other facility with appropriate resources:   Identify barriers to discharge with patient and caregiver   Arrange for needed discharge resources and transportation as appropriate   Identify discharge learning needs (meds, wound care, etc)     Problem: Safety - Adult  Goal: Free from fall injury  Outcome: Progressing     Problem: Respiratory - Adult  Goal: Achieves optimal ventilation and oxygenation  Reactivated     Problem: Cardiovascular - Adult  Goal: Maintains optimal cardiac output and hemodynamic stability  Reactivated  Goal: Absence of cardiac dysrhythmias or at baseline  Reactivated     Problem: Skin/Tissue Integrity - Adult  Goal: Skin integrity remains intact  Reactivated     Problem: Hematologic - Adult  Goal: Maintains hematologic stability  Reactivated

## 2024-11-07 NOTE — DISCHARGE SUMMARY
Hospitalist Discharge Summary   Admit Date:  2024  3:59 PM   DC Note date: 2024  Name:  Alonso Nieto   Age:  69 y.o.  Sex:  male  :  1955   MRN:  [id number]   Room:  Formerly named [facility]  PCP:  Erik Bojorquez MD    Presenting Complaint: Abnormal Test Results     Initial Admission Diagnosis: Bilateral pulmonary embolism (HCC) [I26.99]  Multiple subsegmental pulmonary emboli without acute cor pulmonale (HCC) [I26.94]     Problem List for this Hospitalization (present on admission):    Principal Problem:    Bilateral pulmonary embolism (HCC)  Active Problems:    COPD, mild (HCC)    Hypothyroidism    Celiac disease    Hereditary spherocytosis (HCC)    Generalized anxiety disorder    Insomnia, unspecified    Dyslipidemia    Pulmonary infarct (HCC)  Resolved Problems:    * No resolved hospital problems. *      Hospital Course:  Alonso Nieto is a 69-year-old male past medical history of hypothyroidism, celiac disease on gluten-free diet, insomnia, generalized anxiety disorder, depression, neuropathy, and eczema who presented with mild hemoptysis and shortness of breath as well as right-sided chest pain with deep inspiration.  Patient noted to have COVID-19 in September.  Hemoptysis started roughly 1 week prior to admission.  Patient was seen outpatient and underwent an x-ray of his chest and routine lab work which was unremarkable.  Patient follow-up with his primary care physician and stated that his chest pain and hemoptysis persisted.  He also noted to have traveled to [place] and [place] recently.  On arrival to the ED CTA chest was performed and showed bilateral pulmonary emboli and a spiculated mass in the right lower lobe. Patient was started on a heparin drip.  Pulmonology was consulted.  IR was consulted and state no thrombectomy warranted.  DVT ultrasound of lower extremities revealed local right popliteal DVT.  Patient remained on room air and heparin drip was transitioned to

## 2024-11-07 NOTE — CARE COORDINATION
Discharge order is in. Pt presented to the ED with mild hemoptysis and SOB as well as right-sided chest pain with deep inspiration and admitted for bilat pulmonary embolism. Pt is now discharging home today in stable condition. PTA, pt is indep with his ADLs at baseline. Lives with his spouse. Drives. Is A/O x4. Denies DME needs or recent h/o falls. Denies current HH services. PCP established. [employer] Medicare verified and able to afford home meds. No discharge needs identified by CM. Tx goals met.       11/07/24 1200   Service Assessment   Patient Orientation Alert and Oriented   Cognition Alert   History Provided By Patient   Primary Caregiver Self   Support Systems Spouse/Significant Other;Children;Family Members;[employer];Friends/Neighbors   Patient's Healthcare Decision Maker is: Legal Next of Kin   PCP Verified by CM Yes   Last Visit to PCP Within last 3 months   Prior Functional Level Independent in ADLs/IADLs   Current Functional Level Independent in ADLs/IADLs   Can patient return to prior living arrangement Yes   Ability to make needs known: Good   Family able to assist with home care needs: Yes   Would you like for me to discuss the discharge plan with any other family members/significant others, and if so, who? No   Financial Resources Medicare   Community Resources None   Social/Functional History   Lives With Spouse   Type of Home House   Home Layout One level   Bathroom Toilet Standard   Bathroom Accessibility Accessible   Home Equipment None   Receives Help From Family   ADL Assistance Independent   Homemaking Assistance Independent   Ambulation Assistance Independent   Transfer Assistance Independent   Active  Yes   Occupation Retired   Discharge Planning   Type of Residence House   Living Arrangements Spouse/Significant Other   Current Services Prior To Admission None   Potential Assistance Needed N/A   DME Ordered? No   Potential Assistance Purchasing Medications No   Type of

## 2024-11-07 NOTE — CONSULTS
Concerning recent CT and PE diagnosis, clot burden is small and likely doesn't warrant thrmobectomy.  The peripheral lung opactiy is likely infarct or inflitrate and not neoplasm. Consider DVT leg study and if patient continues with hemoptysis and not be able to tolerate anticoagulation, we could place and IVC filter if needed  
male with emphysema who was admitted to extensive PE, scant hemoptysis, none in past 12 hours on heparin gtt. Also has DVT and small pulmonary infarct.   --okay to stop heparin, start eliquis now  --would be okay with discharge this afternoon, presuming he tolerates initial Eliquis dose  --doubt the CT needs specific follow up. This is clearly infarct and was not there July 2024  --No need for IVC filter if tolerating eliquis  --Had 2 long flights as potential provoking factors and probably can stop eliquis after 3 months, though would consider 81mg aspirin as a mild risk reducer.   --Okay to keep follow up December with GERMAINE Mcneill in our clinic. If having any issues he can call clinic and we can work him in sooner.     Leticia Thorpe MD

## 2024-11-08 NOTE — TELEPHONE ENCOUNTER
Care Transitions Initial Follow Up Call    Outreach made within 2 business days of discharge: Yes    Patient: Alonso Nieto Patient : 1955   MRN: [id number]  Reason for Admission: Bilateral Pe's   Discharge Date: 24       Spoke with: Patient    Discharge department/facility: [facility] Patient Contact:  Was patient able to fill all prescriptions: Yes  Was patient instructed to bring all medications to the follow-up visit: Yes  Is patient taking all medications as directed in the discharge summary? Yes  Does patient understand their discharge instructions: Yes  Does patient have questions or concerns that need addressed prior to 7-14 day follow up office visit: no    Additional needs identified to be addressed with provider  No needs identified             Scheduled appointment with PCP within 7-14 days    Follow Up  Future Appointments   Date Time Provider Department Center   2024  1:40 PM Freida Manuel APRN - NP [facility] DE[facility]   2024  9:40 AM Nehal Mcneill APRN - CNP [facility]   2025  8:15 AM [facility]   2025  9:00 AM Erik Bojorquez MD [facility]       Alexandra Sullivan MA

## 2024-11-13 ENCOUNTER — OFFICE VISIT (OUTPATIENT)
Dept: INTERNAL MEDICINE CLINIC | Facility: CLINIC | Age: 69
End: 2024-11-13

## 2024-11-13 VITALS
WEIGHT: 157 LBS | SYSTOLIC BLOOD PRESSURE: 129 MMHG | HEIGHT: 72 IN | TEMPERATURE: 98.1 F | DIASTOLIC BLOOD PRESSURE: 79 MMHG | OXYGEN SATURATION: 95 % | HEART RATE: 56 BPM | BODY MASS INDEX: 21.26 KG/M2

## 2024-11-13 DIAGNOSIS — I26.99 BILATERAL PULMONARY EMBOLISM (HCC): ICD-10-CM

## 2024-11-13 DIAGNOSIS — Z09 HOSPITAL DISCHARGE FOLLOW-UP: Primary | ICD-10-CM

## 2024-11-13 DIAGNOSIS — R09.81 HEAD CONGESTION: ICD-10-CM

## 2024-11-13 ASSESSMENT — PATIENT HEALTH QUESTIONNAIRE - PHQ9
9. THOUGHTS THAT YOU WOULD BE BETTER OFF DEAD, OR OF HURTING YOURSELF: NOT AT ALL
7. TROUBLE CONCENTRATING ON THINGS, SUCH AS READING THE NEWSPAPER OR WATCHING TELEVISION: NOT AT ALL
2. FEELING DOWN, DEPRESSED OR HOPELESS: SEVERAL DAYS
SUM OF ALL RESPONSES TO PHQ QUESTIONS 1-9: 4
6. FEELING BAD ABOUT YOURSELF - OR THAT YOU ARE A FAILURE OR HAVE LET YOURSELF OR YOUR FAMILY DOWN: NOT AT ALL
SUM OF ALL RESPONSES TO PHQ9 QUESTIONS 1 & 2: 2
SUM OF ALL RESPONSES TO PHQ QUESTIONS 1-9: 4
5. POOR APPETITE OR OVEREATING: NOT AT ALL
4. FEELING TIRED OR HAVING LITTLE ENERGY: SEVERAL DAYS
8. MOVING OR SPEAKING SO SLOWLY THAT OTHER PEOPLE COULD HAVE NOTICED. OR THE OPPOSITE, BEING SO FIGETY OR RESTLESS THAT YOU HAVE BEEN MOVING AROUND A LOT MORE THAN USUAL: NOT AT ALL
SUM OF ALL RESPONSES TO PHQ QUESTIONS 1-9: 4
3. TROUBLE FALLING OR STAYING ASLEEP: SEVERAL DAYS
1. LITTLE INTEREST OR PLEASURE IN DOING THINGS: SEVERAL DAYS
SUM OF ALL RESPONSES TO PHQ QUESTIONS 1-9: 4
10. IF YOU CHECKED OFF ANY PROBLEMS, HOW DIFFICULT HAVE THESE PROBLEMS MADE IT FOR YOU TO DO YOUR WORK, TAKE CARE OF THINGS AT HOME, OR GET ALONG WITH OTHER PEOPLE: NOT DIFFICULT AT ALL

## 2024-11-13 ASSESSMENT — ENCOUNTER SYMPTOMS
RHINORRHEA: 1
SINUS PAIN: 0
SHORTNESS OF BREATH: 0
COUGH: 1

## 2024-11-13 NOTE — PROGRESS NOTES
Post-Discharge Transitional Care Follow Up      Alonso Nieto   YOB: 1955    Date of Office Visit:  11/13/2024  Date of Hospital Admission: 11/6/24  Date of Hospital Discharge: 11/7/24  Readmission Risk Score (high >=14%. Medium >=10%):Readmission Risk Score: 7.4      Care management risk score Rising risk (score 2-5) and Complex Care (Scores >=6): No Risk Score On File     Non face to face  following discharge, date last encounter closed (first attempt may have been earlier): 11/07/2024     Call initiated 2 business days of discharge: Yes     Hospital discharge follow-up  -     IA DISCHARGE MEDS RECONCILED W/ CURRENT OUTPATIENT MED LIST  Bilateral pulmonary embolism (HCC)  -     apixaban (ELIQUIS) 5 MG TABS tablet; Take 1 tablet by mouth 2 times daily, Disp-180 tablet, R-0Normal  Head congestion  - Educated on sinus rinsing and infection prevention.   - Nasacort PRN and 2nd gen AH    Medical Decision Making: MOD complexity  No follow-ups on file.  As scheduled with PCP  On this date 11/13/2024 I have spent 28 minutes reviewing previous notes, test results and face to face with the patient discussing the diagnosis and importance of compliance with the treatment plan as well as documenting on the day of the visit.       Subjective:   Alonso Nieto is a 69-year-old male past medical history of hypothyroidism, celiac disease on gluten-free diet, insomnia, generalized anxiety disorder, depression, neuropathy, and eczema seen today for hospital follow-up.  He was admitted to [facility] for bilateral pulmonary embolism, multiple subsegmental pulmonary emboli without acute cor pulmonale, and focal DVT involving the right popliteal vein after having COVID. He is tolerating Eliquis well.     He did have some brownish sputum after dc from hospital. Clear nasal secretions. He did cough up some yellow sputum today. He feels like he is head congestion and headaches returning. Feels like he is

## 2024-11-15 NOTE — TELEPHONE ENCOUNTER
You  Guptamargaret Nieto \"Junior\" and proxy (Zoey Nieto)Just now (8:55 AM)       Good morning, reviewed your record.  It is possible that DVT in Right leg is origin of clots in lungs.  This does not change any recommendations for RX at this time.     Will review all of this with you at next appt.     Hope that you are doing better.     Nehal

## 2024-12-12 ENCOUNTER — PATIENT MESSAGE (OUTPATIENT)
Dept: PULMONOLOGY | Age: 69
End: 2024-12-12

## 2024-12-13 NOTE — TELEPHONE ENCOUNTER
Yes, I am aware of blood clots.  Have already reviewed message from him since hospitalization.  Dr Thorpe saw him in hospital.  This is likely provoked and can consider stopping bood thinner after 3 months.  I will discuss at upcoming appt.    No, he does not need a follow up CT before appt.    I sent him a message in [employer].

## 2024-12-30 ENCOUNTER — OFFICE VISIT (OUTPATIENT)
Dept: PULMONOLOGY | Age: 69
End: 2024-12-30
Payer: MEDICARE

## 2024-12-30 VITALS
DIASTOLIC BLOOD PRESSURE: 80 MMHG | HEIGHT: 72 IN | BODY MASS INDEX: 21.26 KG/M2 | OXYGEN SATURATION: 98 % | HEART RATE: 78 BPM | SYSTOLIC BLOOD PRESSURE: 135 MMHG | RESPIRATION RATE: 14 BRPM | TEMPERATURE: 97.2 F | WEIGHT: 157 LBS

## 2024-12-30 DIAGNOSIS — J44.9 COPD, MILD (HCC): Primary | ICD-10-CM

## 2024-12-30 DIAGNOSIS — F17.211 CIGARETTE NICOTINE DEPENDENCE IN REMISSION: ICD-10-CM

## 2024-12-30 DIAGNOSIS — I82.431 DEEP VEIN THROMBOSIS (DVT) OF POPLITEAL VEIN OF RIGHT LOWER EXTREMITY, UNSPECIFIED CHRONICITY (HCC): ICD-10-CM

## 2024-12-30 DIAGNOSIS — I26.99 BILATERAL PULMONARY EMBOLISM (HCC): ICD-10-CM

## 2024-12-30 DIAGNOSIS — J43.8 OTHER EMPHYSEMA (HCC): ICD-10-CM

## 2024-12-30 DIAGNOSIS — I26.99 PULMONARY INFARCT (HCC): ICD-10-CM

## 2024-12-30 LAB
EXPIRATORY TIME: NORMAL
FEF 25-75% %PRED-PRE: NORMAL
FEF 25-75% PRED: NORMAL
FEF 25-75-PRE: NORMAL
FEV1 %PRED-PRE: 81 %
FEV1 PRED: 3.45 L
FEV1/FVC %PRED-PRE: NORMAL
FEV1/FVC PRED: NORMAL
FEV1/FVC: 64 %
FEV1: 2.78 L
FVC %PRED-PRE: 96 %
FVC PRED: 4.58 L
FVC: 4.38 L
PEF %PRED-PRE: NORMAL
PEF PRED: NORMAL
PEF-PRE: NORMAL

## 2024-12-30 PROCEDURE — 94010 BREATHING CAPACITY TEST: CPT | Performed by: STUDENT IN AN ORGANIZED HEALTH CARE EDUCATION/TRAINING PROGRAM

## 2024-12-30 PROCEDURE — G8484 FLU IMMUNIZE NO ADMIN: HCPCS | Performed by: NURSE PRACTITIONER

## 2024-12-30 PROCEDURE — G0296 VISIT TO DETERM LDCT ELIG: HCPCS | Performed by: NURSE PRACTITIONER

## 2024-12-30 PROCEDURE — 1123F ACP DISCUSS/DSCN MKR DOCD: CPT | Performed by: NURSE PRACTITIONER

## 2024-12-30 PROCEDURE — 1036F TOBACCO NON-USER: CPT | Performed by: NURSE PRACTITIONER

## 2024-12-30 PROCEDURE — 99214 OFFICE O/P EST MOD 30 MIN: CPT | Performed by: NURSE PRACTITIONER

## 2024-12-30 PROCEDURE — G8420 CALC BMI NORM PARAMETERS: HCPCS | Performed by: NURSE PRACTITIONER

## 2024-12-30 PROCEDURE — 3017F COLORECTAL CA SCREEN DOC REV: CPT | Performed by: NURSE PRACTITIONER

## 2024-12-30 PROCEDURE — 1160F RVW MEDS BY RX/DR IN RCRD: CPT | Performed by: NURSE PRACTITIONER

## 2024-12-30 PROCEDURE — 3023F SPIROM DOC REV: CPT | Performed by: NURSE PRACTITIONER

## 2024-12-30 PROCEDURE — 1159F MED LIST DOCD IN RCRD: CPT | Performed by: NURSE PRACTITIONER

## 2024-12-30 PROCEDURE — G8427 DOCREV CUR MEDS BY ELIG CLIN: HCPCS | Performed by: NURSE PRACTITIONER

## 2024-12-30 ASSESSMENT — PULMONARY FUNCTION TESTS
FEV1_PERCENT_PREDICTED_PRE: 81
FEV1: 2.78
FEV1_PREDICTED: 3.45
FVC_PREDICTED: 4.58
FEV1/FVC: 64
FVC: 4.38
FVC_PERCENT_PREDICTED_PRE: 96

## 2024-12-30 NOTE — PROGRESS NOTES
He is a very pleasant 68-year-old male seen today for follow-up of COPD with centrilobular emphysema, history of tobacco use, incidentally discovered endobronchial lesion that turned out to be a mucous plug.     LDCT 6/20/2024-upper lobe predominant emphysema, basilar predominant chronic interstitial disease.  Stable scarring in both lung apices.  Moderate upper lobe predominant centrilobular and paraseptal emphysema.  Basilar predominant interstitial disease.  Calcified granuloma in RLL.  No noncalcified nodules.  No adenopathy.     Hospitalized 11/2024 with extensive PE.  He did not warrant thrombectomy.  Note that he had COVID in September 2024 after a trip to [place], took Paxlovid and then had flight to [place] and obtained next COVID booster when he returned.  Per review of imaging and history by Dr. Thorpe during hospitalization, felt that defect present on CT was clearly an infarct as it was not present in July 2024.  Felt that to prolonged flights were potential provoking factors and felt that Eliquis could be discontinued after 3 months duration and then consider 81 mg aspirin as mild risk reducer.      DIAGNOSTICS:     Patient with incidental finding of endobronchial abnormality on calcium scoring scan, resolved on repeat CT and clear on bronch.     Spirometry 1/4/2020- mild obstruction.  Chest CT 3/2020-no persistent endobronchial density in right mainstem bronchus although smaller density now seen the more distal bronchus intermedius of the right lung, favors benign aspirated material.  Chest CT 3/2021-previously seen parabronchial lesion is no longer identified. Advanced emphysema.  LDCT 4/11/2022 - Normal. The lungs are emphysematous. No lung mass or suspicious nodule.  Stable scarring in the right apex.  CPFT's 6/1/2021-mild obstructive defect, negative bronchodilator response.  Mild hyperinflation and gas trapping.  Significantly decreased diffusion capacity.   Spirometry 5/31/2022- mild obstructive 
and benefits of screening, including over-diagnosis, false positive rate, and total radiation exposure.  The patient currently exhibits no signs or symptoms suggestive of lung cancer.  Discussed with patient the importance of compliance with yearly annual lung cancer screenings and willingness to undergo diagnosis and treatment if screening scan is positive.  In addition, the patient was counseled regarding the importance of remaining smoke free and/or total smoking cessation.    Also reviewed the following if the patient has Medicare that as of February 10, 2022, Medicare only covers LDCT screening in patients aged 50-77 with at least a 20 pack-year smoking history who currently smoke or have quit in the last 15 years

## 2024-12-31 ASSESSMENT — ENCOUNTER SYMPTOMS
SPUTUM PRODUCTION: 1
COUGH: 1
WHEEZING: 0
HEMOPTYSIS: 0
SHORTNESS OF BREATH: 1

## 2025-01-25 SDOH — HEALTH STABILITY: PHYSICAL HEALTH: ON AVERAGE, HOW MANY MINUTES DO YOU ENGAGE IN EXERCISE AT THIS LEVEL?: 30 MIN

## 2025-01-25 SDOH — HEALTH STABILITY: PHYSICAL HEALTH: ON AVERAGE, HOW MANY DAYS PER WEEK DO YOU ENGAGE IN MODERATE TO STRENUOUS EXERCISE (LIKE A BRISK WALK)?: 3 DAYS

## 2025-01-25 ASSESSMENT — PATIENT HEALTH QUESTIONNAIRE - PHQ9
SUM OF ALL RESPONSES TO PHQ QUESTIONS 1-9: 1
SUM OF ALL RESPONSES TO PHQ QUESTIONS 1-9: 1
5. POOR APPETITE OR OVEREATING: NOT AT ALL
SUM OF ALL RESPONSES TO PHQ QUESTIONS 1-9: 1
3. TROUBLE FALLING OR STAYING ASLEEP: NOT AT ALL
1. LITTLE INTEREST OR PLEASURE IN DOING THINGS: NOT AT ALL
2. FEELING DOWN, DEPRESSED OR HOPELESS: SEVERAL DAYS
SUM OF ALL RESPONSES TO PHQ QUESTIONS 1-9: 1
7. TROUBLE CONCENTRATING ON THINGS, SUCH AS READING THE NEWSPAPER OR WATCHING TELEVISION: NOT AT ALL
10. IF YOU CHECKED OFF ANY PROBLEMS, HOW DIFFICULT HAVE THESE PROBLEMS MADE IT FOR YOU TO DO YOUR WORK, TAKE CARE OF THINGS AT HOME, OR GET ALONG WITH OTHER PEOPLE: NOT DIFFICULT AT ALL
SUM OF ALL RESPONSES TO PHQ9 QUESTIONS 1 & 2: 1
4. FEELING TIRED OR HAVING LITTLE ENERGY: NOT AT ALL
9. THOUGHTS THAT YOU WOULD BE BETTER OFF DEAD, OR OF HURTING YOURSELF: NOT AT ALL
6. FEELING BAD ABOUT YOURSELF - OR THAT YOU ARE A FAILURE OR HAVE LET YOURSELF OR YOUR FAMILY DOWN: NOT AT ALL
8. MOVING OR SPEAKING SO SLOWLY THAT OTHER PEOPLE COULD HAVE NOTICED. OR THE OPPOSITE, BEING SO FIGETY OR RESTLESS THAT YOU HAVE BEEN MOVING AROUND A LOT MORE THAN USUAL: NOT AT ALL

## 2025-01-25 ASSESSMENT — LIFESTYLE VARIABLES
HOW MANY STANDARD DRINKS CONTAINING ALCOHOL DO YOU HAVE ON A TYPICAL DAY: 1
HOW OFTEN DO YOU HAVE A DRINK CONTAINING ALCOHOL: MONTHLY OR LESS
HOW OFTEN DO YOU HAVE SIX OR MORE DRINKS ON ONE OCCASION: 1
HOW OFTEN DO YOU HAVE A DRINK CONTAINING ALCOHOL: 2
HOW MANY STANDARD DRINKS CONTAINING ALCOHOL DO YOU HAVE ON A TYPICAL DAY: 1 OR 2

## 2025-01-28 DIAGNOSIS — E03.9 ACQUIRED HYPOTHYROIDISM: ICD-10-CM

## 2025-01-28 DIAGNOSIS — E78.5 DYSLIPIDEMIA: ICD-10-CM

## 2025-01-28 DIAGNOSIS — R53.82 CHRONIC FATIGUE: ICD-10-CM

## 2025-01-28 DIAGNOSIS — N40.0 BENIGN PROSTATIC HYPERPLASIA WITHOUT LOWER URINARY TRACT SYMPTOMS: ICD-10-CM

## 2025-01-28 DIAGNOSIS — E53.8 VITAMIN B12 DEFICIENCY: ICD-10-CM

## 2025-01-28 LAB
ALBUMIN SERPL-MCNC: 3.6 G/DL (ref 3.2–4.6)
ALBUMIN/GLOB SERPL: 1.1 (ref 1–1.9)
ALP SERPL-CCNC: 130 U/L (ref 40–129)
ALT SERPL-CCNC: 43 U/L (ref 8–55)
ANION GAP SERPL CALC-SCNC: 10 MMOL/L (ref 7–16)
AST SERPL-CCNC: 36 U/L (ref 15–37)
BASOPHILS # BLD: 0.08 K/UL (ref 0–0.2)
BASOPHILS NFR BLD: 1.3 % (ref 0–2)
BILIRUB SERPL-MCNC: 0.9 MG/DL (ref 0–1.2)
BUN SERPL-MCNC: 15 MG/DL (ref 8–23)
CALCIUM SERPL-MCNC: 9.4 MG/DL (ref 8.8–10.2)
CHLORIDE SERPL-SCNC: 105 MMOL/L (ref 98–107)
CHOLEST SERPL-MCNC: 146 MG/DL (ref 0–200)
CO2 SERPL-SCNC: 27 MMOL/L (ref 20–29)
CREAT SERPL-MCNC: 0.96 MG/DL (ref 0.8–1.3)
DIFFERENTIAL METHOD BLD: NORMAL
EOSINOPHIL # BLD: 0.29 K/UL (ref 0–0.8)
EOSINOPHIL NFR BLD: 4.6 % (ref 0.5–7.8)
ERYTHROCYTE [DISTWIDTH] IN BLOOD BY AUTOMATED COUNT: 12.9 % (ref 11.9–14.6)
GLOBULIN SER CALC-MCNC: 3.2 G/DL (ref 2.3–3.5)
GLUCOSE SERPL-MCNC: 86 MG/DL (ref 70–99)
HCT VFR BLD AUTO: 43 % (ref 41.1–50.3)
HDLC SERPL-MCNC: 37 MG/DL (ref 40–60)
HDLC SERPL: 4 (ref 0–5)
HGB BLD-MCNC: 13.6 G/DL (ref 13.6–17.2)
IMM GRANULOCYTES # BLD AUTO: 0.02 K/UL (ref 0–0.5)
IMM GRANULOCYTES NFR BLD AUTO: 0.3 % (ref 0–5)
LDLC SERPL CALC-MCNC: 87 MG/DL (ref 0–100)
LYMPHOCYTES # BLD: 2.02 K/UL (ref 0.5–4.6)
LYMPHOCYTES NFR BLD: 31.9 % (ref 13–44)
MCH RBC QN AUTO: 31.8 PG (ref 26.1–32.9)
MCHC RBC AUTO-ENTMCNC: 31.6 G/DL (ref 31.4–35)
MCV RBC AUTO: 100.5 FL (ref 82–102)
MONOCYTES # BLD: 0.76 K/UL (ref 0.1–1.3)
MONOCYTES NFR BLD: 12 % (ref 4–12)
NEUTS SEG # BLD: 3.17 K/UL (ref 1.7–8.2)
NEUTS SEG NFR BLD: 49.9 % (ref 43–78)
NRBC # BLD: 0 K/UL (ref 0–0.2)
PLATELET # BLD AUTO: 226 K/UL (ref 150–450)
PMV BLD AUTO: 11.4 FL (ref 9.4–12.3)
POTASSIUM SERPL-SCNC: 4.5 MMOL/L (ref 3.5–5.1)
PROT SERPL-MCNC: 6.8 G/DL (ref 6.3–8.2)
PSA SERPL-MCNC: 4.2 NG/ML (ref 0–4)
RBC # BLD AUTO: 4.28 M/UL (ref 4.23–5.6)
SODIUM SERPL-SCNC: 142 MMOL/L (ref 136–145)
T4 FREE SERPL-MCNC: 1.4 NG/DL (ref 0.9–1.7)
TRIGL SERPL-MCNC: 109 MG/DL (ref 0–150)
TSH, 3RD GENERATION: 1.46 UIU/ML (ref 0.27–4.2)
VIT B12 SERPL-MCNC: 1303 PG/ML (ref 193–986)
VLDLC SERPL CALC-MCNC: 22 MG/DL (ref 6–23)
WBC # BLD AUTO: 6.3 K/UL (ref 4.3–11.1)

## 2025-02-04 ENCOUNTER — OFFICE VISIT (OUTPATIENT)
Dept: INTERNAL MEDICINE CLINIC | Facility: CLINIC | Age: 70
End: 2025-02-04

## 2025-02-04 VITALS
SYSTOLIC BLOOD PRESSURE: 135 MMHG | BODY MASS INDEX: 21.89 KG/M2 | HEIGHT: 72 IN | RESPIRATION RATE: 16 BRPM | DIASTOLIC BLOOD PRESSURE: 84 MMHG | WEIGHT: 161.6 LBS | TEMPERATURE: 98.2 F | HEART RATE: 75 BPM

## 2025-02-04 DIAGNOSIS — E03.9 ACQUIRED HYPOTHYROIDISM: ICD-10-CM

## 2025-02-04 DIAGNOSIS — I82.431 DEEP VEIN THROMBOSIS (DVT) OF POPLITEAL VEIN OF RIGHT LOWER EXTREMITY, UNSPECIFIED CHRONICITY (HCC): ICD-10-CM

## 2025-02-04 DIAGNOSIS — Z00.00 MEDICARE ANNUAL WELLNESS VISIT, SUBSEQUENT: Primary | ICD-10-CM

## 2025-02-04 DIAGNOSIS — J43.8 OTHER EMPHYSEMA (HCC): ICD-10-CM

## 2025-02-04 DIAGNOSIS — R93.1 ELEVATED CORONARY ARTERY CALCIUM SCORE: ICD-10-CM

## 2025-02-04 DIAGNOSIS — F33.1 MODERATE EPISODE OF RECURRENT MAJOR DEPRESSIVE DISORDER (HCC): ICD-10-CM

## 2025-02-04 DIAGNOSIS — K90.0 CELIAC DISEASE: ICD-10-CM

## 2025-02-04 DIAGNOSIS — I26.99 BILATERAL PULMONARY EMBOLISM (HCC): ICD-10-CM

## 2025-02-04 DIAGNOSIS — D12.5 ADENOMATOUS POLYP OF SIGMOID COLON: ICD-10-CM

## 2025-02-04 DIAGNOSIS — R97.20 ELEVATED PSA: ICD-10-CM

## 2025-02-04 RX ORDER — ROSUVASTATIN CALCIUM 20 MG/1
20 TABLET, COATED ORAL NIGHTLY
Qty: 90 TABLET | Refills: 3 | Status: SHIPPED | OUTPATIENT
Start: 2025-02-04

## 2025-02-04 RX ORDER — LEVOTHYROXINE SODIUM 100 UG/1
100 TABLET ORAL DAILY
Qty: 90 TABLET | Refills: 3 | Status: SHIPPED | OUTPATIENT
Start: 2025-02-04

## 2025-02-04 NOTE — PROGRESS NOTES
2/4/2025 9:36 AM  Location:[facility] PHYSICIAN SERVICES  [facility]ICINE  [place]  Patient #:  [id number]  YOB: 1955          YOUR LAST HEMOGLOBIN A1CS:   No results found for: \"HBA1C\", \"[id number]\"    YOUR LAST LIPID PROFILE:   Lab Results   Component Value Date/Time    CHOL 146 01/28/2025 08:08 AM    HDL 37 01/28/2025 08:08 AM    LDL 87 01/28/2025 08:08 AM    LDL 75.6 01/24/2024 11:26 AM    VLDL 22 01/28/2025 08:08 AM    VLDL 16 02/03/2022 08:18 AM         Lab Results   Component Value Date/Time    GFRAA 96 02/03/2022 08:18 AM    BUN 15 01/28/2025 08:08 AM     01/28/2025 08:08 AM    K 4.5 01/28/2025 08:08 AM     01/28/2025 08:08 AM    CO2 27 01/28/2025 08:08 AM           History of Present Illness     Chief Complaint   Patient presents with    Medicare AWV     Subsequent    6 Month Follow-Up     Pt presents to the office today for a 6 month follow-up      Blood Work     Want to discuss his labs; PSA has jumped up and want to discuss his TSH    Discuss Medications     Want to see about stopping his b-12   This patient was diagnosed with an acute DVT in pulmonary emboli this past year.  This occurred after he combination of a long air flight and a COVID-vaccine that was administered after the flight and having had COVID previously.  He is followed now by pulmonary medicine and is anticipating being on anticoagulant therapy with a 3-month course.  He has no prior history of venous thrombosis and denies a family history of this.  He denies any bleeding issues from Eliquis.    Mr. Nieto is a 69 y.o. male  who presents for office visit      Allergies   Allergen Reactions    Barley Grass     Gluten Meal Other (See Comments)    Rice     Wheat Other (See Comments)     Past Medical History:   Diagnosis Date    Abdominal pain, epigastric     Acute upper respiratory infections of unspecified site     Celiac disease     Depression     Disturbance of skin sensation     Dyspepsia and

## 2025-03-07 ENCOUNTER — OFFICE VISIT (OUTPATIENT)
Dept: PULMONOLOGY | Age: 70
End: 2025-03-07
Payer: MEDICARE

## 2025-03-07 VITALS
SYSTOLIC BLOOD PRESSURE: 130 MMHG | TEMPERATURE: 98 F | WEIGHT: 162 LBS | BODY MASS INDEX: 21.94 KG/M2 | RESPIRATION RATE: 20 BRPM | OXYGEN SATURATION: 99 % | DIASTOLIC BLOOD PRESSURE: 80 MMHG | HEIGHT: 72 IN | HEART RATE: 74 BPM

## 2025-03-07 DIAGNOSIS — I82.431 DEEP VEIN THROMBOSIS (DVT) OF POPLITEAL VEIN OF RIGHT LOWER EXTREMITY, UNSPECIFIED CHRONICITY (HCC): ICD-10-CM

## 2025-03-07 DIAGNOSIS — I26.99 BILATERAL PULMONARY EMBOLISM (HCC): ICD-10-CM

## 2025-03-07 DIAGNOSIS — J44.9 COPD, MILD (HCC): ICD-10-CM

## 2025-03-07 DIAGNOSIS — Z87.891 PERSONAL HISTORY OF TOBACCO USE, PRESENTING HAZARDS TO HEALTH: ICD-10-CM

## 2025-03-07 DIAGNOSIS — Z87.891 PERSONAL HISTORY OF TOBACCO USE: Primary | ICD-10-CM

## 2025-03-07 PROCEDURE — G2211 COMPLEX E/M VISIT ADD ON: HCPCS | Performed by: INTERNAL MEDICINE

## 2025-03-07 PROCEDURE — 99214 OFFICE O/P EST MOD 30 MIN: CPT | Performed by: INTERNAL MEDICINE

## 2025-03-07 PROCEDURE — 1123F ACP DISCUSS/DSCN MKR DOCD: CPT | Performed by: INTERNAL MEDICINE

## 2025-03-07 PROCEDURE — 1126F AMNT PAIN NOTED NONE PRSNT: CPT | Performed by: INTERNAL MEDICINE

## 2025-03-07 PROCEDURE — 3023F SPIROM DOC REV: CPT | Performed by: INTERNAL MEDICINE

## 2025-03-07 PROCEDURE — G8420 CALC BMI NORM PARAMETERS: HCPCS | Performed by: INTERNAL MEDICINE

## 2025-03-07 PROCEDURE — 1159F MED LIST DOCD IN RCRD: CPT | Performed by: INTERNAL MEDICINE

## 2025-03-07 PROCEDURE — 3017F COLORECTAL CA SCREEN DOC REV: CPT | Performed by: INTERNAL MEDICINE

## 2025-03-07 PROCEDURE — G8427 DOCREV CUR MEDS BY ELIG CLIN: HCPCS | Performed by: INTERNAL MEDICINE

## 2025-03-07 PROCEDURE — 1160F RVW MEDS BY RX/DR IN RCRD: CPT | Performed by: INTERNAL MEDICINE

## 2025-03-07 PROCEDURE — G0296 VISIT TO DETERM LDCT ELIG: HCPCS | Performed by: INTERNAL MEDICINE

## 2025-03-07 PROCEDURE — 1036F TOBACCO NON-USER: CPT | Performed by: INTERNAL MEDICINE

## 2025-03-07 RX ORDER — ALBUTEROL SULFATE 90 UG/1
INHALANT RESPIRATORY (INHALATION)
Qty: 17 DEVICE | Refills: 11 | Status: SHIPPED | OUTPATIENT
Start: 2025-03-07

## 2025-03-07 NOTE — PROGRESS NOTES
Discussed with the patient the current USPSTF guidelines released March 9, 2021 for screening for lung cancer.    For adults aged 50 to 80 years who have a 20 pack-year smoking history and currently smoke or have quit within the past 15 years the grade B recommendation is to:  Screen for lung cancer with low-dose computed tomography (LDCT) every year.  Stop screening once a person has not smoked for 15 years or has a health problem that limits life expectancy or the ability to have lung surgery.    The patient  reports that he quit smoking about 10 years ago. His smoking use included cigarettes. He started smoking about 49 years ago. He has a 58.5 pack-year smoking history. He has never used smokeless tobacco.. Discussed with patient the risks and benefits of screening, including over-diagnosis, false positive rate, and total radiation exposure.  The patient currently exhibits no signs or symptoms suggestive of lung cancer.  Discussed with patient the importance of compliance with yearly annual lung cancer screenings and willingness to undergo diagnosis and treatment if screening scan is positive.  In addition, the patient was counseled regarding the importance of remaining smoke free and/or total smoking cessation.    Also reviewed the following if the patient has Medicare that as of February 10, 2022, Medicare only covers LDCT screening in patients aged 50-77 with at least a 20 pack-year smoking history who currently smoke or have quit in the last 15 years  
    CTA B, no w/r/r     Heart:   Regular rate and rhythm, S1, S2 normal, no murmur, click, rub or gallop.     Abdomen:    Soft, non-tender.     Extremities:  Extremities normal, atraumatic, no cyanosis or edema.     Skin:  Skin color normal. No rashes or lesions     Neurologic:  A&Ox3     DIAGNOSTIC TESTS:                                                                                    LABS:   Lab Results   Component Value Date/Time    WBC 6.3 01/28/2025 08:08 AM    HGB 13.6 01/28/2025 08:08 AM    HCT 43.0 01/28/2025 08:08 AM     01/28/2025 08:08 AM    TSH 1.460 01/28/2025 08:08 AM    CRP 9 11/05/2024 09:24 AM     Imaging: I performed an independent interpretation of the patient's images.  CXR:   XR LUMBAR SPINE (2-3 VIEWS) 09/09/2021    Narrative  AUTOMATIC ADMINISTRATIVE RESULT    The result for this exam can be found in the Progress note in the chart.    Impression  :  See Progress note in the chart.    CT Chest:   CT CHEST PULMONARY EMBOLISM W CONTRAST 11/06/2024    Narrative  EXAMINATION: CT CHEST PULMONARY EMBOLISM W CONTRAST 11/6/2024 5:26 PM    ACCESSION NUMBER: [id number]    COMPARISON: None available    INDICATION: Bilateral PE, lung mass, cannot access imaging from other facility    TECHNIQUE: Multiple contiguous 2D axial CT images of the chest were obtained  from the lung apices to the lung bases after the intravenous administration of  100mL Iso-emiliano 370 per pulmonary angiography protocol.  Coronal reconstructions  were performed.    Radiation dose reduction techniques were used for this study. Our CT scanners  use one or all of the following: Automated exposure control, adjustment of the  mA and/or kV according to patient size, iterative reconstruction.    FINDINGS:  STUDY QUALITY: The exam study quality is good.    AIRWAYS: The central airways are patent.    LUNGS: There is extensive centrilobular and paraseptal emphysema with biapical  predominance. [place] hump at the right lung base

## 2025-04-02 DIAGNOSIS — I26.99 BILATERAL PULMONARY EMBOLISM (HCC): ICD-10-CM

## 2025-04-02 DIAGNOSIS — I82.431 DEEP VEIN THROMBOSIS (DVT) OF POPLITEAL VEIN OF RIGHT LOWER EXTREMITY, UNSPECIFIED CHRONICITY: ICD-10-CM

## 2025-04-03 RX ORDER — APIXABAN 5 MG/1
5 TABLET, FILM COATED ORAL 2 TIMES DAILY
Qty: 60 TABLET | Refills: 0 | Status: SHIPPED | OUTPATIENT
Start: 2025-04-03

## 2025-04-03 NOTE — TELEPHONE ENCOUNTER
Patient Refill Request     Last Office Visit: 03/07/2025 with Dr. Jimenez     When they were supposed to follow up: Follow up in June     Upcoming Office Visit: 07/03/2025     Refills Requested: Eliquis 5 mg     Type of refill: 30 day     Requested Pharmacy: [facility]     Is prescription pended? Yes

## 2025-05-06 ENCOUNTER — RESULTS FOLLOW-UP (OUTPATIENT)
Dept: INTERNAL MEDICINE CLINIC | Facility: CLINIC | Age: 70
End: 2025-05-06

## 2025-05-06 DIAGNOSIS — R97.20 ELEVATED PSA: ICD-10-CM

## 2025-05-06 LAB — PSA SERPL-MCNC: 0.7 NG/ML (ref 0–4)

## 2025-06-24 ENCOUNTER — HOSPITAL ENCOUNTER (OUTPATIENT)
Dept: CT IMAGING | Age: 70
Discharge: HOME | End: 2025-06-26
Attending: INTERNAL MEDICINE
Payer: MEDICARE

## 2025-06-24 DIAGNOSIS — Z87.891 PERSONAL HISTORY OF TOBACCO USE: ICD-10-CM

## 2025-06-24 PROCEDURE — 71271 CT THORAX LUNG CANCER SCR C-: CPT

## 2025-06-26 ENCOUNTER — RESULTS FOLLOW-UP (OUTPATIENT)
Dept: PULMONOLOGY | Age: 70
End: 2025-06-26

## 2025-06-26 DIAGNOSIS — I82.431 DEEP VEIN THROMBOSIS (DVT) OF POPLITEAL VEIN OF RIGHT LOWER EXTREMITY, UNSPECIFIED CHRONICITY (HCC): Primary | ICD-10-CM

## 2025-07-03 ENCOUNTER — OFFICE VISIT (OUTPATIENT)
Dept: PULMONOLOGY | Age: 70
End: 2025-07-03
Payer: MEDICARE

## 2025-07-03 VITALS
TEMPERATURE: 98 F | RESPIRATION RATE: 20 BRPM | HEIGHT: 72 IN | SYSTOLIC BLOOD PRESSURE: 130 MMHG | OXYGEN SATURATION: 96 % | DIASTOLIC BLOOD PRESSURE: 70 MMHG | WEIGHT: 157 LBS | BODY MASS INDEX: 21.26 KG/M2 | HEART RATE: 62 BPM

## 2025-07-03 DIAGNOSIS — I82.431 DEEP VEIN THROMBOSIS (DVT) OF POPLITEAL VEIN OF RIGHT LOWER EXTREMITY, UNSPECIFIED CHRONICITY (HCC): ICD-10-CM

## 2025-07-03 DIAGNOSIS — Z87.891 PERSONAL HISTORY OF TOBACCO USE: ICD-10-CM

## 2025-07-03 DIAGNOSIS — J44.9 COPD, MILD (HCC): ICD-10-CM

## 2025-07-03 DIAGNOSIS — I26.99 BILATERAL PULMONARY EMBOLISM (HCC): Primary | ICD-10-CM

## 2025-07-03 PROCEDURE — G2211 COMPLEX E/M VISIT ADD ON: HCPCS | Performed by: INTERNAL MEDICINE

## 2025-07-03 PROCEDURE — 1160F RVW MEDS BY RX/DR IN RCRD: CPT | Performed by: INTERNAL MEDICINE

## 2025-07-03 PROCEDURE — 99214 OFFICE O/P EST MOD 30 MIN: CPT | Performed by: INTERNAL MEDICINE

## 2025-07-03 PROCEDURE — 1036F TOBACCO NON-USER: CPT | Performed by: INTERNAL MEDICINE

## 2025-07-03 PROCEDURE — 1123F ACP DISCUSS/DSCN MKR DOCD: CPT | Performed by: INTERNAL MEDICINE

## 2025-07-03 PROCEDURE — 3023F SPIROM DOC REV: CPT | Performed by: INTERNAL MEDICINE

## 2025-07-03 PROCEDURE — 3017F COLORECTAL CA SCREEN DOC REV: CPT | Performed by: INTERNAL MEDICINE

## 2025-07-03 PROCEDURE — G8427 DOCREV CUR MEDS BY ELIG CLIN: HCPCS | Performed by: INTERNAL MEDICINE

## 2025-07-03 PROCEDURE — 1159F MED LIST DOCD IN RCRD: CPT | Performed by: INTERNAL MEDICINE

## 2025-07-03 PROCEDURE — G8420 CALC BMI NORM PARAMETERS: HCPCS | Performed by: INTERNAL MEDICINE

## 2025-07-03 RX ORDER — ALBUTEROL SULFATE 90 UG/1
2 INHALANT RESPIRATORY (INHALATION) EVERY 4 HOURS PRN
Qty: 1 EACH | Refills: 11 | Status: SHIPPED | OUTPATIENT
Start: 2025-07-03

## 2025-07-03 NOTE — PROGRESS NOTES
Name:  Alonso Nieto  YOB: 1955   MRN: [id number]      Office Visit: 7/3/2025       Assessment & Plan (Medical Decision Making)    Impression: 69 y.o. male presenting with a history of former tobacco abuse, significant emphysema on CT scan with minimal obstruction on spirometry, diagnosed with first episode of bilateral PEs November 2024.  Clinically doing well and at his baseline.  Came off of anticoagulation April 2025 and has been doing well.  Does have some dilation of blood vessels along the right calf.  Reviewed with him that these may simply be superficial in nature but with some ongoing long trips he is taking want to evaluate this further.      Significant emphysema seen on CT scans but no bothersome pulmonary symptoms and is able to control with as needed albuterol.    -With ongoing periodic long trips and some palpable cords on the posterior right calf we will perform an ultrasound of the right leg to ensure there is no new or ongoing DVT.  - If clot is present this would prompt us to put him back on lifelong anticoagulation.  - In the meantime he does have a prescription for 1 month of Eliquis to take during a planned long plane trip in September.  - Refilled and he should continue as needed albuterol.  - Reviewed with him that the presence of significant emphysema and the option to start long-acting inhalers in the future should he want this.  - He is on 81 mg of aspirin when he is not taking his Eliquis.  -rx for compression stockings will be sent to DME.   - Continues to qualify for annual lung cancer screening CTs.  This should be done next June 2026.  -He continues not to smoke.    Follow-up in 6 months      1. Bilateral pulmonary embolism (HCC)    2. Deep vein thrombosis (DVT) of popliteal vein of right lower extremity, unspecified chronicity (HCC)  - Vascular duplex lower extremity venous right; Future    3. COPD, mild (HCC)  - albuterol sulfate HFA

## 2025-07-07 NOTE — TELEPHONE ENCOUNTER
Dr. Jimenez, I fixed the prescription and pended to the patient's preferred pharmacy.  Please sign off if appropriate.  Thank you so much! // Alyssa CORTES

## 2025-07-09 ENCOUNTER — HOSPITAL ENCOUNTER (OUTPATIENT)
Dept: ULTRASOUND IMAGING | Age: 70
Discharge: HOME | End: 2025-07-11
Attending: INTERNAL MEDICINE
Payer: MEDICARE

## 2025-07-09 DIAGNOSIS — I82.431 DEEP VEIN THROMBOSIS (DVT) OF POPLITEAL VEIN OF RIGHT LOWER EXTREMITY, UNSPECIFIED CHRONICITY (HCC): ICD-10-CM

## 2025-07-09 PROCEDURE — 93971 EXTREMITY STUDY: CPT

## 2025-07-09 PROCEDURE — 93971 EXTREMITY STUDY: CPT | Performed by: RADIOLOGY

## 2025-07-13 RX ORDER — ALBUTEROL SULFATE 90 UG/1
2 INHALANT RESPIRATORY (INHALATION) EVERY 4 HOURS PRN
Qty: 1 EACH | Refills: 11 | Status: SHIPPED | OUTPATIENT
Start: 2025-07-13

## 2025-07-28 ENCOUNTER — TELEPHONE (OUTPATIENT)
Dept: PULMONOLOGY | Age: 70
End: 2025-07-28

## 2025-07-28 NOTE — TELEPHONE ENCOUNTER
Called the patient and notified him that [facility] sent us a notification that they do not carry the compression socks so I sent a new order to [facility].  I provided the patient with their number and explained to give them at least 2 days to contact him as the order has not been processed yet.  Patient verbalized understanding of the conversation and stated that he will give our office a call if he runs into any issues.  No further questions or concerns were asked at this time. // Alyssa CORTES

## 2025-07-28 NOTE — TELEPHONE ENCOUNTER
Pt calling because  was sending in an order for compression socks and he has not heard anything from us or company. Pt states he does not remember who he was supposed to be getting them from. Please call pt back and let him know what's going on with that. [phone]

## 2025-08-26 ENCOUNTER — OFFICE VISIT (OUTPATIENT)
Dept: INTERNAL MEDICINE CLINIC | Facility: CLINIC | Age: 70
End: 2025-08-26
Payer: MEDICARE

## 2025-08-26 VITALS
DIASTOLIC BLOOD PRESSURE: 69 MMHG | HEART RATE: 61 BPM | SYSTOLIC BLOOD PRESSURE: 126 MMHG | RESPIRATION RATE: 16 BRPM | BODY MASS INDEX: 21.21 KG/M2 | OXYGEN SATURATION: 95 % | WEIGHT: 156.6 LBS | TEMPERATURE: 97.3 F | HEIGHT: 72 IN

## 2025-08-26 DIAGNOSIS — K90.0 CELIAC DISEASE: ICD-10-CM

## 2025-08-26 DIAGNOSIS — I82.431 DEEP VEIN THROMBOSIS (DVT) OF POPLITEAL VEIN OF RIGHT LOWER EXTREMITY, UNSPECIFIED CHRONICITY (HCC): ICD-10-CM

## 2025-08-26 DIAGNOSIS — I26.99 BILATERAL PULMONARY EMBOLISM (HCC): ICD-10-CM

## 2025-08-26 DIAGNOSIS — E78.5 DYSLIPIDEMIA: ICD-10-CM

## 2025-08-26 DIAGNOSIS — E03.9 ACQUIRED HYPOTHYROIDISM: ICD-10-CM

## 2025-08-26 DIAGNOSIS — D58.0 HEREDITARY SPHEROCYTOSIS: ICD-10-CM

## 2025-08-26 DIAGNOSIS — J44.9 COPD, MILD (HCC): ICD-10-CM

## 2025-08-26 DIAGNOSIS — F33.1 MODERATE EPISODE OF RECURRENT MAJOR DEPRESSIVE DISORDER (HCC): ICD-10-CM

## 2025-08-26 DIAGNOSIS — D12.5 ADENOMATOUS POLYP OF SIGMOID COLON: ICD-10-CM

## 2025-08-26 DIAGNOSIS — K21.9 GASTROESOPHAGEAL REFLUX DISEASE WITHOUT ESOPHAGITIS: ICD-10-CM

## 2025-08-26 DIAGNOSIS — R93.1 ELEVATED CORONARY ARTERY CALCIUM SCORE: Primary | ICD-10-CM

## 2025-08-26 PROBLEM — R91.8 ABNORMAL CT SCAN OF LUNG: Status: RESOLVED | Noted: 2020-01-27 | Resolved: 2025-08-26

## 2025-08-26 PROCEDURE — 99214 OFFICE O/P EST MOD 30 MIN: CPT | Performed by: INTERNAL MEDICINE

## 2025-08-26 PROCEDURE — G8427 DOCREV CUR MEDS BY ELIG CLIN: HCPCS | Performed by: INTERNAL MEDICINE

## 2025-08-26 PROCEDURE — G8420 CALC BMI NORM PARAMETERS: HCPCS | Performed by: INTERNAL MEDICINE

## 2025-08-26 PROCEDURE — 3017F COLORECTAL CA SCREEN DOC REV: CPT | Performed by: INTERNAL MEDICINE

## 2025-08-26 PROCEDURE — 3023F SPIROM DOC REV: CPT | Performed by: INTERNAL MEDICINE

## 2025-08-26 PROCEDURE — G2211 COMPLEX E/M VISIT ADD ON: HCPCS | Performed by: INTERNAL MEDICINE

## 2025-08-26 PROCEDURE — 1126F AMNT PAIN NOTED NONE PRSNT: CPT | Performed by: INTERNAL MEDICINE

## 2025-08-26 PROCEDURE — 1159F MED LIST DOCD IN RCRD: CPT | Performed by: INTERNAL MEDICINE

## 2025-08-26 PROCEDURE — 1160F RVW MEDS BY RX/DR IN RCRD: CPT | Performed by: INTERNAL MEDICINE

## 2025-08-26 PROCEDURE — 1123F ACP DISCUSS/DSCN MKR DOCD: CPT | Performed by: INTERNAL MEDICINE

## 2025-08-26 PROCEDURE — 1036F TOBACCO NON-USER: CPT | Performed by: INTERNAL MEDICINE
